# Patient Record
Sex: MALE | Race: BLACK OR AFRICAN AMERICAN | NOT HISPANIC OR LATINO | URBAN - METROPOLITAN AREA
[De-identification: names, ages, dates, MRNs, and addresses within clinical notes are randomized per-mention and may not be internally consistent; named-entity substitution may affect disease eponyms.]

---

## 2023-06-29 ENCOUNTER — HOSPITAL ENCOUNTER (INPATIENT)
Facility: HOSPITAL | Age: 28
LOS: 8 days | Discharge: HOME | DRG: 885 | End: 2023-07-07
Attending: PSYCHIATRY & NEUROLOGY | Admitting: PSYCHIATRY & NEUROLOGY
Payer: MEDICAID

## 2023-06-29 DIAGNOSIS — F29 PSYCHOSIS: ICD-10-CM

## 2023-06-29 PROCEDURE — 11400000 HC PSYCH PRIVATE ROOM

## 2023-06-29 RX ORDER — ACETAMINOPHEN 325 MG/1
650 TABLET ORAL EVERY 6 HOURS PRN
Status: DISCONTINUED | OUTPATIENT
Start: 2023-06-29 | End: 2023-07-07 | Stop reason: HOSPADM

## 2023-06-29 RX ORDER — MAG HYDROX/ALUMINUM HYD/SIMETH 200-200-20
30 SUSPENSION, ORAL (FINAL DOSE FORM) ORAL EVERY 6 HOURS PRN
Status: DISCONTINUED | OUTPATIENT
Start: 2023-06-29 | End: 2023-07-07 | Stop reason: HOSPADM

## 2023-06-29 RX ORDER — DIPHENHYDRAMINE HYDROCHLORIDE 50 MG/ML
50 INJECTION INTRAMUSCULAR; INTRAVENOUS EVERY 6 HOURS PRN
Status: DISCONTINUED | OUTPATIENT
Start: 2023-06-29 | End: 2023-07-07 | Stop reason: HOSPADM

## 2023-06-29 RX ORDER — LORAZEPAM 2 MG/ML
2 INJECTION INTRAMUSCULAR EVERY 4 HOURS PRN
Status: DISCONTINUED | OUTPATIENT
Start: 2023-06-29 | End: 2023-07-07 | Stop reason: HOSPADM

## 2023-06-29 RX ORDER — HALOPERIDOL 5 MG/1
10 TABLET ORAL EVERY 6 HOURS PRN
Status: DISCONTINUED | OUTPATIENT
Start: 2023-06-29 | End: 2023-07-07 | Stop reason: HOSPADM

## 2023-06-29 RX ORDER — IBUPROFEN 200 MG
1 TABLET ORAL DAILY
Status: DISCONTINUED | OUTPATIENT
Start: 2023-06-29 | End: 2023-07-07 | Stop reason: HOSPADM

## 2023-06-29 RX ORDER — DIPHENHYDRAMINE HCL 50 MG
50 CAPSULE ORAL EVERY 6 HOURS PRN
Status: DISCONTINUED | OUTPATIENT
Start: 2023-06-29 | End: 2023-07-07 | Stop reason: HOSPADM

## 2023-06-29 RX ORDER — HYDROXYZINE HYDROCHLORIDE 50 MG/1
50 TABLET, FILM COATED ORAL EVERY 4 HOURS PRN
Status: DISCONTINUED | OUTPATIENT
Start: 2023-06-29 | End: 2023-07-07 | Stop reason: HOSPADM

## 2023-06-29 RX ORDER — HALOPERIDOL 5 MG/ML
10 INJECTION INTRAMUSCULAR EVERY 6 HOURS PRN
Status: DISCONTINUED | OUTPATIENT
Start: 2023-06-29 | End: 2023-07-07 | Stop reason: HOSPADM

## 2023-06-29 RX ORDER — TRAZODONE HYDROCHLORIDE 100 MG/1
100 TABLET ORAL NIGHTLY PRN
Status: DISCONTINUED | OUTPATIENT
Start: 2023-06-29 | End: 2023-07-07 | Stop reason: HOSPADM

## 2023-06-29 NOTE — PLAN OF CARE
Problem: Adult Inpatient Plan of Care  Goal: Plan of Care Review  Outcome: Ongoing, Not Progressing  Goal: Patient-Specific Goal (Individualized)  Outcome: Ongoing, Not Progressing  Goal: Absence of Hospital-Acquired Illness or Injury  Outcome: Ongoing, Not Progressing  Goal: Optimal Comfort and Wellbeing  Outcome: Ongoing, Not Progressing  Goal: Readiness for Transition of Care  Outcome: Ongoing, Not Progressing     Problem: Behavior Regulation Impairment (Psychotic Signs/Symptoms)  Goal: Improved Behavioral Control (Psychotic Signs/Symptoms)  Outcome: Ongoing, Not Progressing     Problem: Cognitive Impairment (Psychotic Signs/Symptoms)  Goal: Optimal Cognitive Function (Psychotic Signs/Symptoms)  Outcome: Ongoing, Not Progressing     Problem: Decreased Participation and Engagement (Psychotic Signs/Symptoms)  Goal: Increased Participation and Engagement (Psychotic Signs/Symptoms)  Outcome: Ongoing, Not Progressing     Problem: Mood Impairment (Psychotic Signs/Symptoms)  Goal: Improved Mood Symptoms (Psychotic Signs/Symptoms)  Outcome: Ongoing, Not Progressing     Problem: Psychomotor Impairment (Psychotic Signs/Symptoms)  Goal: Improved Psychomotor Symptoms (Psychotic Signs/Symptoms)  Outcome: Ongoing, Not Progressing     Problem: Sensory Perception Impairment (Psychotic Signs/Symptoms)  Goal: Decreased Sensory Symptoms (Psychotic Signs/Symptoms)  Outcome: Ongoing, Not Progressing     Problem: Sleep Disturbance (Psychotic Signs/Symptoms)  Goal: Improved Sleep (Psychotic Signs/Symptoms)  Outcome: Ongoing, Not Progressing     Problem: Social, Occupational or Functional Impairment (Psychotic Signs/Symptoms)  Goal: Enhanced Social, Occupational or Functional Skills (Psychotic Signs/Symptoms)  Outcome: Ongoing, Not Progressing     Problem: Activity and Energy Impairment (Excessive Substance Use)  Goal: Optimized Energy Level (Excessive Substance Use)  Outcome: Ongoing, Not Progressing     Problem: Behavior Regulation  Impairment (Excessive Substance Use)  Goal: Improved Behavioral Control (Excessive Substance Use)  Outcome: Ongoing, Not Progressing     Problem: Decreased Participation and Engagement (Excessive Substance Use)  Goal: Increased Participation and Engagement (Excessive Substance Use)  Outcome: Ongoing, Not Progressing     Problem: Physiologic Impairment (Excessive Substance Use)  Goal: Improved Physiologic Symptoms (Excessive Substance Use)  Outcome: Ongoing, Not Progressing     Problem: Social, Occupational or Functional Impairment (Excessive Substance Use)  Goal: Enhanced Social, Occupational or Functional Skills (Excessive Substance Use)  Outcome: Ongoing, Not Progressing

## 2023-06-29 NOTE — NURSING
"Admission Note:    Libia Escalona is a 27 y.o. male, : 1995, MRN: [id number], admitted on 2023 to [facility] ([facility]) for Ceasar Newman MD with a diagnosis of Psychosis [F29]. Patient admitted on a status of Physician Emergency Certificate (PEC). Libia reports no known food or drug allergies.    Patient demonstrated an affect that was flat, anxious, and inappropriate. Patient demonstrated mood during assessment that was anxious. Patient had an appearance that was disheveled.  Patient denies suicidal ideation. Patient denies suicide plan. Patient denies hallucinations.    Carmens  height is 5' 6" (1.676 m) and weight is 49.1 kg (108 lb 3.9 oz). His oral temperature is 98.1 °F (36.7 °C). His blood pressure is 117/57 (abnormal) and his pulse is 71. His respiration is 20 and oxygen saturation is 98%.     Carmens last BM was noted on: 23.    Metal detector screening performed via security personnel. The result of the scan was no contraband found.Head-to-toe physical assessment completed with the following findings: no wounds found upon body screen. A full skin assessment was performed. Carmens skin appeared warm, dry, and intact. Lbiia was oriented to unit, staff, peers, and room. Patient belongings/valuables stored in locked intake room cabinet and changes of clothing provided to patient. Libia was placed on Q 15 min observations.       "

## 2023-06-30 LAB
CHOLEST SERPL-MCNC: 116 MG/DL
CHOLEST/HDLC SERPL: 2 {RATIO} (ref 0–5)
EST. AVERAGE GLUCOSE BLD GHB EST-MCNC: 102.5 MG/DL
GLUCOSE P FAST SERPL-MCNC: 87 MG/DL (ref 70–155)
HBA1C MFR BLD: 5.2 %
HDLC SERPL-MCNC: 49 MG/DL (ref 35–60)
LDLC SERPL CALC-MCNC: 58 MG/DL (ref 50–140)
T PALLIDUM AB SER QL: NONREACTIVE
TRIGL SERPL-MCNC: 44 MG/DL (ref 34–140)
VLDLC SERPL CALC-MCNC: 9 MG/DL

## 2023-06-30 PROCEDURE — 86780 TREPONEMA PALLIDUM: CPT | Performed by: PSYCHIATRY & NEUROLOGY

## 2023-06-30 PROCEDURE — 80061 LIPID PANEL: CPT | Performed by: PSYCHIATRY & NEUROLOGY

## 2023-06-30 PROCEDURE — 11400000 HC PSYCH PRIVATE ROOM

## 2023-06-30 PROCEDURE — 25000003 PHARM REV CODE 250

## 2023-06-30 PROCEDURE — 82947 ASSAY GLUCOSE BLOOD QUANT: CPT | Performed by: PSYCHIATRY & NEUROLOGY

## 2023-06-30 PROCEDURE — 83036 HEMOGLOBIN GLYCOSYLATED A1C: CPT | Performed by: PSYCHIATRY & NEUROLOGY

## 2023-06-30 RX ORDER — OLANZAPINE 5 MG/1
10 TABLET ORAL DAILY
Status: DISCONTINUED | OUTPATIENT
Start: 2023-06-30 | End: 2023-07-03

## 2023-06-30 RX ADMIN — OLANZAPINE 10 MG: 5 TABLET, FILM COATED ORAL at 10:06

## 2023-06-30 NOTE — PROGRESS NOTES
"Libia is a 27 male admitted for Psychotic Disorder NOS and Other (or Unknown) Substance Abuse, with an unknown uds results. Pt reports ability to perform his ADL's despite unkempt appearance and licks himself. CTRS educated Pt to TR group times and dates, CTRS will encourage Libia to attend due to psychosis at this time. Pt reported TR treatment goal as sobriety and community  services.       06/30/23 0946   General   Admit Date 06/29/23   Primary Diagnosis Psychotic Disorder NOS   Secondary Diagnosis Other (or Unknown) Substance Abuse   Mormonism Adventist   Number of Children 0   Children Living? 0   Occupation unemployed   Does the patient have dentures? No   If you were to take part in activities, which of the following would you prefer? Activities that you do alone   Do you feel like you have enough to keep you busy now? Yes   Do you believe that you have the opportunity for physical activity? Yes   Activity Capabilities Minimum   Subjective   Patient states "Drug abuse, I smoked some mojo"   Assessment   Mobility ambulates independently   Transfers independently   Visual Acuity normal vision   Visual Perception depth perception;color perception;recognizes letters;recognizes numbers   Hearing normal   Speech/Communication normal   Cognitive Concerns problem solving;abstract thinking;concentration;attention span;confusion;further evaluation may determine other cognitive concerns   Emotional Concerns withholds emotional response   Leisure Interest Survey   Leisure Interest Survey Yes   Solitary Activities   Music Listening Current Interest   Reading Current Interest   Physical Activities   Basketball Current Interest   Outdoor Activities   Other Outdoor Activity Other (see comment)  (outdoors)   Therapeutic Recreation   Community Reintegration Needs increased awareness of accessibility issues   Stress Management/Relaxation Training Unable to identify stress management/relaxation techniques   Social Skills " Atypical behaviors & mannerisms are observed   Leisure Education Lacks understanding of value of leisure involvement in recreation   Leisure Resources Lacks awareness of leisure resources   Leisure Attitude/Participation Unable to identify benefits of leisure involvement   Rec Therapy Group Assistance Independent;verbal cues   Problem Solving Activity Assistance verbal cues;Stand by Assistance   Goals   Additional Documentation yes   Goal Formulation With patient   Time For Goal Achievement 7 days   Goal 1 soberity & community  services   Plan   Planned Therapy Intervention Group Recreational Therapy   Expected Length of Stay 5-7days   PT Frequency Minimum of 3 visits per week
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
## 2023-06-30 NOTE — GROUP NOTE
Group Psychotherapy       Group Focus: Stress Management   Group topic: Therapy Ball: Therapist explored patients need for identifying personal strengths and qualities in working towards mental health goals by asking questions from our therapy ball. Patient was able to discuss increasing emotional and mental wellness through positive thinking and strength recognition. Patient continues to make progress towards treatment goals.    Number of patients in attendance: 3      Group Start Time: 1045  Group End Time:  1130  Groups Date: 6/30/2023  Group Topic:  Behavioral Health  Group Department: [facility]  Group Facilitators:  Tessie Mcmahon  _____________________________________________________________________    Patient Name: Libia Escalona  MRN: [id number]  Patient Class: IP- Psych   Admission Date\Time: 6/29/2023  2:44 PM  Hospital Length of Stay: 8  Primary Care Provider: Rolando Da Silva Jr, MD     Referred by: Acute Psychiatry Unit Treatment Team     Target symptoms: Psychosis     Patient's response to treatment: Not Participating     Progress toward goals: Not progressing     Interval History:      Diagnosis:      Plan: Continue treatment on APU

## 2023-06-30 NOTE — PLAN OF CARE
Problem: Adult Inpatient Plan of Care  Goal: Plan of Care Review  Outcome: Ongoing, Progressing  Goal: Patient-Specific Goal (Individualized)  Outcome: Ongoing, Progressing  Goal: Absence of Hospital-Acquired Illness or Injury  Outcome: Ongoing, Progressing  Goal: Optimal Comfort and Wellbeing  Outcome: Ongoing, Progressing  Goal: Readiness for Transition of Care  Outcome: Ongoing, Progressing     Problem: Behavior Regulation Impairment (Psychotic Signs/Symptoms)  Goal: Improved Behavioral Control (Psychotic Signs/Symptoms)  Outcome: Ongoing, Progressing     Problem: Cognitive Impairment (Psychotic Signs/Symptoms)  Goal: Optimal Cognitive Function (Psychotic Signs/Symptoms)  Outcome: Ongoing, Progressing     Problem: Decreased Participation and Engagement (Psychotic Signs/Symptoms)  Goal: Increased Participation and Engagement (Psychotic Signs/Symptoms)  Outcome: Ongoing, Progressing     Problem: Mood Impairment (Psychotic Signs/Symptoms)  Goal: Improved Mood Symptoms (Psychotic Signs/Symptoms)  Outcome: Ongoing, Progressing     Problem: Psychomotor Impairment (Psychotic Signs/Symptoms)  Goal: Improved Psychomotor Symptoms (Psychotic Signs/Symptoms)  Outcome: Ongoing, Progressing     Problem: Sensory Perception Impairment (Psychotic Signs/Symptoms)  Goal: Decreased Sensory Symptoms (Psychotic Signs/Symptoms)  Outcome: Ongoing, Progressing     Problem: Sleep Disturbance (Psychotic Signs/Symptoms)  Goal: Improved Sleep (Psychotic Signs/Symptoms)  Outcome: Ongoing, Progressing     Problem: Social, Occupational or Functional Impairment (Psychotic Signs/Symptoms)  Goal: Enhanced Social, Occupational or Functional Skills (Psychotic Signs/Symptoms)  Outcome: Ongoing, Progressing     Problem: Activity and Energy Impairment (Excessive Substance Use)  Goal: Optimized Energy Level (Excessive Substance Use)  Outcome: Ongoing, Progressing     Problem: Behavior Regulation Impairment (Excessive Substance Use)  Goal: Improved  Behavioral Control (Excessive Substance Use)  Outcome: Ongoing, Progressing     Problem: Decreased Participation and Engagement (Excessive Substance Use)  Goal: Increased Participation and Engagement (Excessive Substance Use)  Outcome: Ongoing, Progressing     Problem: Physiologic Impairment (Excessive Substance Use)  Goal: Improved Physiologic Symptoms (Excessive Substance Use)  Outcome: Ongoing, Progressing     Problem: Social, Occupational or Functional Impairment (Excessive Substance Use)  Goal: Enhanced Social, Occupational or Functional Skills (Excessive Substance Use)  Outcome: Ongoing, Progressing

## 2023-06-30 NOTE — PLAN OF CARE
Problem: Adult Inpatient Plan of Care  Goal: Plan of Care Review  Outcome: Ongoing, Not Progressing  Goal: Patient-Specific Goal (Individualized)  Outcome: Ongoing, Not Progressing  Goal: Absence of Hospital-Acquired Illness or Injury  Outcome: Ongoing, Not Progressing  Goal: Optimal Comfort and Wellbeing  Outcome: Ongoing, Not Progressing  Goal: Readiness for Transition of Care  Outcome: Ongoing, Not Progressing     Problem: Behavior Regulation Impairment (Psychotic Signs/Symptoms)  Goal: Improved Behavioral Control (Psychotic Signs/Symptoms)  Outcome: Ongoing, Not Progressing     Problem: Cognitive Impairment (Psychotic Signs/Symptoms)  Goal: Optimal Cognitive Function (Psychotic Signs/Symptoms)  Outcome: Ongoing, Not Progressing     Problem: Decreased Participation and Engagement (Psychotic Signs/Symptoms)  Goal: Increased Participation and Engagement (Psychotic Signs/Symptoms)  Outcome: Ongoing, Not Progressing     Problem: Mood Impairment (Psychotic Signs/Symptoms)  Goal: Improved Mood Symptoms (Psychotic Signs/Symptoms)  Outcome: Ongoing, Not Progressing     Problem: Psychomotor Impairment (Psychotic Signs/Symptoms)  Goal: Improved Psychomotor Symptoms (Psychotic Signs/Symptoms)  Outcome: Ongoing, Not Progressing     Problem: Sensory Perception Impairment (Psychotic Signs/Symptoms)  Goal: Decreased Sensory Symptoms (Psychotic Signs/Symptoms)  Outcome: Ongoing, Not Progressing     Problem: Sleep Disturbance (Psychotic Signs/Symptoms)  Goal: Improved Sleep (Psychotic Signs/Symptoms)  Outcome: Ongoing, Not Progressing     Problem: Social, Occupational or Functional Impairment (Psychotic Signs/Symptoms)  Goal: Enhanced Social, Occupational or Functional Skills (Psychotic Signs/Symptoms)  Outcome: Ongoing, Not Progressing     Problem: Activity and Energy Impairment (Excessive Substance Use)  Goal: Optimized Energy Level (Excessive Substance Use)  Outcome: Ongoing, Not Progressing     Problem: Physiologic  Impairment (Excessive Substance Use)  Goal: Improved Physiologic Symptoms (Excessive Substance Use)  Outcome: Ongoing, Not Progressing     Problem: Social, Occupational or Functional Impairment (Excessive Substance Use)  Goal: Enhanced Social, Occupational or Functional Skills (Excessive Substance Use)  Outcome: Ongoing, Not Progressing     Problem: Decreased Participation and Engagement (Excessive Substance Use)  Goal: Increased Participation and Engagement (Excessive Substance Use)  Outcome: Ongoing, Not Progressing

## 2023-06-30 NOTE — NURSING
"Daily Nursing Note:      Behavior:    Patient (Libia Escalona is a 27 y.o. male, : 1995, MRN: [id number]) demonstrating an affect that was flat and anxious. Libia demonstrating mood that is depressed and anxious. Libia had an appearance that was disheveled. Libia denies suicidal ideation. Libia denies suicide plan. Libia denies homicidal ideation. Libia denies hallucinations.    Libia's  height is 5' 6" (1.676 m) and weight is 49.1 kg (108 lb 3.9 oz). His oral temperature is 98.1 °F (36.7 °C). His blood pressure is 117/57 (abnormal) and his pulse is 71. His respiration is 20 and oxygen saturation is 98%.       Intervention:    Encourage Libia to perform self-hygiene, grooming, and changing of clothing. Monitor Libia's behavior and program compliance. Monitor [place] for suicidal ideation, homicidal ideation, sleep disturbance, and hallucinations. Encourage Libia to eat all portions of meals and assess for meal preferences. Monitor [place] for intake and output to ensure hydration. Notify the Physician/Physician Assistant/Advance Practice Registered Nurse (MD/PA/APRN) for any medication refusal and any change in patient condition.      Response:    Libia verbalizes understand of unit process and procedures.      Plan:     Continue to monitor per MD/PA/APRN orders; maintain patient safety.   "

## 2023-06-30 NOTE — H&P
[facility] - Behavioral Health Unit  History & Physical    Subjective:      Chief Complaint/Reason for Admission: psychosis with substance abuse     Libia Escalona is a 27 y.o. male. Psychosis recently     No past medical history on file.  No past surgical history on file.  No family history on file.       No medications prior to admission.     Review of patient's allergies indicates:  No Known Allergies     Review of Systems   Constitutional: Negative.    HENT: Negative.     Eyes: Negative.    Respiratory: Negative.     Cardiovascular: Negative.    Gastrointestinal: Negative.    Genitourinary: Negative.    Musculoskeletal: Negative.    Skin: Negative.    Neurological: Negative.    Endo/Heme/Allergies: Negative.    Psychiatric/Behavioral:  Positive for substance abuse. Negative for depression, hallucinations and suicidal ideas.      Objective:      Vital Signs (Most Recent)  Temp: 97.7 °F (36.5 °C) (06/30/23 1100)  Pulse: (!) 59 (06/30/23 1100)  Resp: 18 (06/30/23 1100)  BP: 94/66 (06/30/23 1100)  SpO2: 98 % (06/30/23 1100)    Vital Signs Range (Last 24H):  Temp:  [97.7 °F (36.5 °C)-98.1 °F (36.7 °C)]   Pulse:  [59-82]   Resp:  [18-20]   BP: ()/(57-89)   SpO2:  [98 %]     Physical Exam  HENT:      Head: Normocephalic.      Right Ear: Tympanic membrane normal.      Left Ear: Tympanic membrane normal.      Nose: Nose normal.      Mouth/Throat:      Mouth: Mucous membranes are moist.   Eyes:      Extraocular Movements: Extraocular movements intact.      Pupils: Pupils are equal, round, and reactive to light.   Cardiovascular:      Rate and Rhythm: Normal rate and regular rhythm.   Pulmonary:      Effort: Pulmonary effort is normal.   Abdominal:      General: Abdomen is flat.   Musculoskeletal:         General: Normal range of motion.   Skin:     General: Skin is warm.   Neurological:      General: No focal deficit present.      Mental Status: He is alert and oriented to person, place, and time.       Comments: Vision normal   Hearing normal   EOM intact   Face muscles normal  Facial sensation normal   Shrugs shoulders  Tongue midline          Data Review:    Recent Results (from the past 48 hour(s))   SYPHILIS ANTIBODY (WITH REFLEX RPR)    Collection Time: 06/30/23  6:44 AM   Result Value Ref Range    Syphilis Antibody Nonreactive Nonreactive, Equivocal   Hemoglobin A1C    Collection Time: 06/30/23  6:44 AM   Result Value Ref Range    Hemoglobin A1c 5.2 <=7.0 %    Estimated Average Glucose 102.5 mg/dL   Glucose, Fasting    Collection Time: 06/30/23  6:44 AM   Result Value Ref Range    Glucose Fasting 87 70 - 155 mg/dL   Lipid Panel    Collection Time: 06/30/23  6:44 AM   Result Value Ref Range    Cholesterol Total 116 <=200 mg/dL    HDL Cholesterol 49 35 - 60 mg/dL    Triglyceride 44 34 - 140 mg/dL    Cholesterol/HDL Ratio 2 0 - 5    Very Low Density Lipoprotein 9     LDL Cholesterol 58.00 50.00 - 140.00 mg/dL        No results found.       Assessment and Plan       Psychosis   Substance abuse

## 2023-06-30 NOTE — GROUP NOTE
Group Psychotherapy       Group Focus: Medication Education.      Number of patients in attendance: 14    Group Start Time: 0800  Group End Time:  0900  Groups Date: 6/30/2023  Group Topic:  Behavioral Health  Group Department: [facility]  Group Facilitators:  Jesus Crooks RN  _____________________________________________________________________    Patient Name: Libia Escalona  MRN: [id number]  Patient Class: IP- Psych   Admission Date\Time: 6/29/2023  2:44 PM  Hospital Length of Stay: 1  Primary Care Provider: Rolando Da Silva Jr, MD     Referred by: Acute Psychiatry Unit Treatment Team     Target symptoms: Anxiety and Psychosis     Patient's response to treatment: Active Listening     Progress toward goals: Progressing well     Interval History:      Diagnosis: Psychosis.     Plan: Continue treatment on APU

## 2023-06-30 NOTE — PLAN OF CARE
Psychosocial Assessment     Pt is a 27 y.o. YO  male admitted due to psychosis. Pt UDS was Positive for THC pt admits to smoking mojo.  Pt ETOH < 10. Pt reports  AVH at this time. Pt last inpatient  was 3 years ago . Pt presents Cooperative with CM staff 1:1. Pt originally from [place]  . Pt has  0 dependents. Pt  is Single .  Pt completed Middle School. Pt denies  service.  Pt reports financial issues. Pt unemployed.  Pt works at  unemployed. Pt denies legal issues. Pt states they do  receive comfort from spiritual practices. Pt emergency contact is Mother Leti Larsen. Their phone number is  [phone] . Pt discharge plan at this time is home [address]    06/30/23 4040   Initial Information   Source of Information patient   Reason for Admission Psychosis   Patient Aware of Diagnosis yes   Arrived From emergency department   Legal Status    Type of Admission Involuntary   Type of Involuntary Admission PEC;CEC   Spiritual Beliefs   Spiritual, Cultural Beliefs, Druze Practices, Values that Affect Care yes   Description of Beliefs that Will Affect Care Buddhist   Substance Use/Withdrawal   Substance Use Current, used prior to admission   Additional Tobacco Use   How many cigarettes do you typically have per day? 20   Abuse Screen (yes response referral indicated)   Feels Unsafe at Home or Work/School no   Feels Threatened by Someone no   Does anyone try to keep you from having contact with others or doing things outside your home? no   AUDIT-C (Alcohol Use Disorders ID Test)   Alcohol Use In Past Year 1-->monthly or less   Alcohol Amount Per Day In Past Year 0-->one or two   More Than 6 Drinks On One Occasion In Past Year 0-->never   Total Audit C Score 1     .

## 2023-06-30 NOTE — NURSING
"Daily Nursing Note:      Behavior:    Patient (Libia Escalona is a 27 y.o. male, : 1995, MRN: [id number]) demonstrating an affect that was flat, anxious, irritable,  labile, and inappropriate. Libia demonstrating mood that is anxious. Libia had an appearance that was disheveled. Libia denies suicidal ideation. Libia denies suicide plan. Libia denies homicidal ideation. Libia endorses hallucinations.    Libia's  height is 5' 6" (1.676 m) and weight is 49.1 kg (108 lb 3.9 oz). His oral temperature is 98.1 °F (36.7 °C). His blood pressure is 117/57 (abnormal) and his pulse is 71. His respiration is 20 and oxygen saturation is 98%.     Carmens last BM was noted on: 23.      Intervention:    Encourage Libia to perform self-hygiene, grooming, and changing of clothing. Monitor Libia's behavior and program compliance. Monitor Libia for suicidal ideation, homicidal ideation, sleep disturbance, and hallucinations. Encourage Libia to eat all portions of meals and assess for meal preferences. Monitor Cordtyler for intake and output to ensure hydration. Notify the Physician/Physician Assistant/Advance Practice Registered Nurse (MD/PA/APRN) for any medication refusal and any change in patient condition.      Response:    Libia verbalizes understand of unit process and procedures.      Plan:     Continue to monitor per MD/PA/APRN orders; maintain patient safety.    "

## 2023-07-01 PROCEDURE — 11400000 HC PSYCH PRIVATE ROOM

## 2023-07-01 PROCEDURE — 25000003 PHARM REV CODE 250

## 2023-07-01 RX ADMIN — OLANZAPINE 10 MG: 5 TABLET, FILM COATED ORAL at 08:07

## 2023-07-01 NOTE — NURSING
"Daily Nursing Note:      Behavior:    Patient (Libia Escalona is a 27 y.o. male, : 1995, MRN: [id number]) demonstrating an affect that was flat, anxious, irritable, and  labile. Libia demonstrating mood that is anxious. Libia had an appearance that was disheveled. Libia denies suicidal ideation. Libia denies suicide plan. Libia denies homicidal ideation. Libia endorses hallucinations.    Libia's  height is 5' 6" (1.676 m) and weight is 49.1 kg (108 lb 3.9 oz). His temperature is 98 °F (36.7 °C). His blood pressure is 89/59 (abnormal) and his pulse is 83. His respiration is 18 and oxygen saturation is 98%.     Carmens last BM was noted on: 23.      Intervention:    Encourage Libia to perform self-hygiene, grooming, and changing of clothing. Monitor Libia's behavior and program compliance. Monitor Libia for suicidal ideation, homicidal ideation, sleep disturbance, and hallucinations. Encourage Libia to eat all portions of meals and assess for meal preferences. Monitor Libia for intake and output to ensure hydration. Notify the Physician/Physician Assistant/Advance Practice Registered Nurse (MD/PA/APRN) for any medication refusal and any change in patient condition.      Response:    Libia verbalizes understand of unit process and procedures.       Plan:     Continue to monitor per MD/PA/APRN orders; maintain patient safety.    "

## 2023-07-01 NOTE — PLAN OF CARE
Problem: Adult Inpatient Plan of Care  Goal: Plan of Care Review  Outcome: Ongoing, Progressing  Goal: Patient-Specific Goal (Individualized)  Outcome: Ongoing, Progressing  Goal: Absence of Hospital-Acquired Illness or Injury  Outcome: Ongoing, Progressing  Goal: Optimal Comfort and Wellbeing  Outcome: Ongoing, Progressing  Goal: Readiness for Transition of Care  Outcome: Ongoing, Progressing     Problem: Behavior Regulation Impairment (Psychotic Signs/Symptoms)  Goal: Improved Behavioral Control (Psychotic Signs/Symptoms)  Outcome: Ongoing, Progressing     Problem: Cognitive Impairment (Psychotic Signs/Symptoms)  Goal: Optimal Cognitive Function (Psychotic Signs/Symptoms)  Outcome: Ongoing, Progressing     Problem: Cognitive Impairment (Psychotic Signs/Symptoms)  Goal: Optimal Cognitive Function (Psychotic Signs/Symptoms)  Outcome: Ongoing, Progressing     Problem: Decreased Participation and Engagement (Psychotic Signs/Symptoms)  Goal: Increased Participation and Engagement (Psychotic Signs/Symptoms)  Outcome: Ongoing, Progressing     Problem: Mood Impairment (Psychotic Signs/Symptoms)  Goal: Improved Mood Symptoms (Psychotic Signs/Symptoms)  Outcome: Ongoing, Progressing

## 2023-07-01 NOTE — GROUP NOTE
Group Psychotherapy       Group Focus: Psychiatric Medication Education      Number of patients in attendance: 12    Group Start Time: 2030  Group End Time:  2100  Groups Date: 6/30/2023  Group Topic:  Behavioral Health  Group Department: [facility]  Group Facilitators:  Dianne Kaplan RN  _____________________________________________________________________    Patient Name: Libia Escalona  MRN: [id number]  Patient Class: IP- Psych   Admission Date\Time: 6/29/2023  2:44 PM  Hospital Length of Stay: 1  Primary Care Provider: Rolando Da Silva Jr, MD     Referred by: Acute Psychiatry Unit Treatment Team     Target symptoms: Psychosis     Patient's response to treatment: Not Participating     Progress toward goals: Progressing slowly     Interval History:      Diagnosis: Psychosis     Plan: Continue treatment on APU

## 2023-07-01 NOTE — NURSING
"Daily Nursing Note:      Behavior:    Patient (Libia Escalona is a 27 y.o. male, : 1995, MRN: [id number]) demonstrating an affect that was flat and anxious. Libia demonstrating mood that is anxious. Libia had an appearance that was disheveled. Libia denies suicidal ideation. Libia denies suicide plan. Libia denies homicidal ideation. Libia denies hallucinations.    Libia's  height is 5' 6" (1.676 m) and weight is 49.1 kg (108 lb 3.9 oz). His temperature is 97.7 °F (36.5 °C). His blood pressure is 94/66 and his pulse is 59 (abnormal). His respiration is 18 and oxygen saturation is 98%.       Intervention:    Encourage Libia to perform self-hygiene, grooming, and changing of clothing. Monitor Libia's behavior and program compliance. Monitor Cordtyler for suicidal ideation, homicidal ideation, sleep disturbance, and hallucinations. Encourage Libia to eat all portions of meals and assess for meal preferences. Monitor Cordez for intake and output to ensure hydration. Notify the Physician/Physician Assistant/Advance Practice Registered Nurse (MD/PA/APRN) for any medication refusal and any change in patient condition.      Response:    Libia verbalizes understand of unit process and procedures.      Plan:     Continue to monitor per MD/PA/APRN orders; maintain patient safety.   "

## 2023-07-01 NOTE — GROUP NOTE
Group Psychotherapy       Group Focus: Medication Education.      Number of patients in attendance: 12    Group Start Time: 0800  Group End Time:  0900  Groups Date: 7/1/2023  Group Topic:  Behavioral Health  Group Department: [facility] Unit  Group Facilitators:  Jesus Crooks RN  _____________________________________________________________________    Patient Name: Libia Escalona  MRN: [id number]  Patient Class: IP- Psych   Admission Date\Time: 6/29/2023  2:44 PM  Hospital Length of Stay: 2  Primary Care Provider: Rolando Da Silva Jr, MD     Referred by: Acute Psychiatry Unit Treatment Team     Target symptoms: Anxiety and Psychosis     Patient's response to treatment: Active Listening     Progress toward goals: Progressing well     Interval History:      Diagnosis: Psychosis.     Plan: Continue treatment on APU

## 2023-07-02 PROCEDURE — 11400000 HC PSYCH PRIVATE ROOM

## 2023-07-02 PROCEDURE — 25000003 PHARM REV CODE 250

## 2023-07-02 RX ADMIN — OLANZAPINE 10 MG: 5 TABLET, FILM COATED ORAL at 08:07

## 2023-07-02 NOTE — NURSING
"Daily Nursing Note:      Behavior:    Patient (Libia Escalona is a 27 y.o. male, : 1995, MRN: [id number]) demonstrating an affect that was flat and anxious. Libia demonstrating mood that is anxious. Libia had an appearance that was disheveled. Libia denies suicidal ideation. Libia denies suicide plan. Libia denies homicidal ideation. Libia endorses hallucinations.    Libia's  height is 5' 6" (1.676 m) and weight is 49.1 kg (108 lb 3.9 oz). His temperature is 98.1 °F (36.7 °C). His blood pressure is 123/76 and his pulse is 80. His respiration is 20 and oxygen saturation is 98%.     Carmens last BM was noted on: 23.      Intervention:    Encourage Libia to perform self-hygiene, grooming, and changing of clothing. Monitor Libia's behavior and program compliance. Monitor Libia for suicidal ideation, homicidal ideation, sleep disturbance, and hallucinations. Encourage Libia to eat all portions of meals and assess for meal preferences. Monitor Cordez for intake and output to ensure hydration. Notify the Physician/Physician Assistant/Advance Practice Registered Nurse (MD/PA/APRN) for any medication refusal and any change in patient condition.      Response:    Libia verbalizes understand of unit process and procedures.       Plan:     Continue to monitor per MD/PA/APRN orders; maintain patient safety.    "

## 2023-07-02 NOTE — GROUP NOTE
Group Psychotherapy       Group Focus: Medication Education.      Number of patients in attendance: 15    Group Start Time: 0800  Group End Time:  0900  Groups Date: 7/2/2023  Group Topic:  Behavioral Health  Group Department: [facility]  Group Facilitators:  Jesus Crooks RN  _____________________________________________________________________    Patient Name: Libia Escalona  MRN: [id number]  Patient Class: IP- Psych   Admission Date\Time: 6/29/2023  2:44 PM  Hospital Length of Stay: 3  Primary Care Provider: Rolando Da Silva Jr, MD     Referred by: Acute Psychiatry Unit Treatment Team     Target symptoms: Anxiety and Psychosis     Patient's response to treatment: Active Listening     Progress toward goals: Progressing well     Interval History:      Diagnosis: Psychosis.     Plan: Continue treatment on APU

## 2023-07-02 NOTE — NURSING
"Daily Nursing Note:      Behavior:    Patient (Libia Escalona is a 27 y.o. male, : 1995, MRN: [id number]) demonstrating an affect that was flat and anxious. Libia demonstrating mood that is anxious. Libia had an appearance that was disheveled. Libia denies suicidal ideation. Libia denies suicide plan. Libia denies homicidal ideation. Libia denies hallucinations.    Libia's  height is 5' 6" (1.676 m) and weight is 49.1 kg (108 lb 3.9 oz). His temperature is 97.9 °F (36.6 °C). His blood pressure is 98/55 (abnormal) and his pulse is 93. His respiration is 20 and oxygen saturation is 100%.     Carmens last BM was noted on: 23      Intervention:    Encourage Libia to perform self-hygiene, grooming, and changing of clothing. Monitor Libia's behavior and program compliance. Monitor Libia for suicidal ideation, homicidal ideation, sleep disturbance, and hallucinations. Encourage Libia to eat all portions of meals and assess for meal preferences. Monitor Cordez for intake and output to ensure hydration. Notify the Physician/Physician Assistant/Advance Practice Registered Nurse (MD/PA/APRN) for any medication refusal and any change in patient condition.      Response:    Libia verbalizes understand of unit process and procedures. Libia reported medications ______.      Plan:     Continue to monitor per MD/PA/APRN orders; maintain patient safety.   "

## 2023-07-02 NOTE — PLAN OF CARE
Problem: Behavior Regulation Impairment (Psychotic Signs/Symptoms)  Goal: Improved Behavioral Control (Psychotic Signs/Symptoms)  Outcome: Ongoing, Progressing     Problem: Cognitive Impairment (Psychotic Signs/Symptoms)  Goal: Optimal Cognitive Function (Psychotic Signs/Symptoms)  Outcome: Ongoing, Progressing     Problem: Decreased Participation and Engagement (Psychotic Signs/Symptoms)  Goal: Increased Participation and Engagement (Psychotic Signs/Symptoms)  Outcome: Ongoing, Progressing     Problem: Mood Impairment (Psychotic Signs/Symptoms)  Goal: Improved Mood Symptoms (Psychotic Signs/Symptoms)  Outcome: Ongoing, Progressing     Problem: Psychomotor Impairment (Psychotic Signs/Symptoms)  Goal: Improved Psychomotor Symptoms (Psychotic Signs/Symptoms)  Outcome: Ongoing, Progressing     Problem: Sensory Perception Impairment (Psychotic Signs/Symptoms)  Goal: Decreased Sensory Symptoms (Psychotic Signs/Symptoms)  Outcome: Ongoing, Progressing     Problem: Sleep Disturbance (Psychotic Signs/Symptoms)  Goal: Improved Sleep (Psychotic Signs/Symptoms)  Outcome: Ongoing, Progressing     Problem: Social, Occupational or Functional Impairment (Psychotic Signs/Symptoms)  Goal: Enhanced Social, Occupational or Functional Skills (Psychotic Signs/Symptoms)  Outcome: Ongoing, Progressing     Problem: Activity and Energy Impairment (Excessive Substance Use)  Goal: Optimized Energy Level (Excessive Substance Use)  Outcome: Ongoing, Progressing     Problem: Behavior Regulation Impairment (Excessive Substance Use)  Goal: Improved Behavioral Control (Excessive Substance Use)  Outcome: Ongoing, Progressing     Problem: Decreased Participation and Engagement (Excessive Substance Use)  Goal: Increased Participation and Engagement (Excessive Substance Use)  Outcome: Ongoing, Progressing     Problem: Physiologic Impairment (Excessive Substance Use)  Goal: Improved Physiologic Symptoms (Excessive Substance Use)  Outcome: Ongoing,  Progressing     Problem: Social, Occupational or Functional Impairment (Excessive Substance Use)  Goal: Enhanced Social, Occupational or Functional Skills (Excessive Substance Use)  Outcome: Ongoing, Progressing     Problem: Adult Inpatient Plan of Care  Goal: Plan of Care Review  Outcome: Met  Goal: Patient-Specific Goal (Individualized)  Outcome: Met  Goal: Absence of Hospital-Acquired Illness or Injury  Outcome: Met  Goal: Optimal Comfort and Wellbeing  Outcome: Met  Goal: Readiness for Transition of Care  Outcome: Met

## 2023-07-02 NOTE — GROUP NOTE
Group Psychotherapy       Group Focus: Psychiatric Medication Education      Number of patients in attendance: 13    Group Start Time: 2030  Group End Time:  2100  Groups Date: 7/1/2023  Group Topic:  Behavioral Health  Group Department: [facility]  Group Facilitators:  Dianne Kaplan RN  _____________________________________________________________________    Patient Name: Libia Escalona  MRN: [id number]  Patient Class: IP- Psych   Admission Date\Time: 6/29/2023  2:44 PM  Hospital Length of Stay: 3  Primary Care Provider: Rolando Da Silva Jr, MD     Referred by: Acute Psychiatry Unit Treatment Team     Target symptoms: Psychosis     Patient's response to treatment: Not Participating     Progress toward goals: Progressing slowly     Interval History:      Diagnosis: Psychosis     Plan: Continue treatment on APU

## 2023-07-03 PROCEDURE — 11400000 HC PSYCH PRIVATE ROOM

## 2023-07-03 PROCEDURE — 25000003 PHARM REV CODE 250: Performed by: PSYCHIATRY & NEUROLOGY

## 2023-07-03 PROCEDURE — 25000003 PHARM REV CODE 250

## 2023-07-03 RX ORDER — BENZTROPINE MESYLATE 1 MG/1
1 TABLET ORAL 2 TIMES DAILY
Status: DISCONTINUED | OUTPATIENT
Start: 2023-07-03 | End: 2023-07-07 | Stop reason: HOSPADM

## 2023-07-03 RX ORDER — HALOPERIDOL 5 MG/1
5 TABLET ORAL 2 TIMES DAILY
Status: DISCONTINUED | OUTPATIENT
Start: 2023-07-03 | End: 2023-07-05

## 2023-07-03 RX ADMIN — HALOPERIDOL 5 MG: 5 TABLET ORAL at 11:07

## 2023-07-03 RX ADMIN — OLANZAPINE 10 MG: 5 TABLET, FILM COATED ORAL at 08:07

## 2023-07-03 RX ADMIN — BENZTROPINE MESYLATE 1 MG: 1 TABLET ORAL at 11:07

## 2023-07-03 RX ADMIN — HALOPERIDOL 5 MG: 5 TABLET ORAL at 08:07

## 2023-07-03 RX ADMIN — BENZTROPINE MESYLATE 1 MG: 1 TABLET ORAL at 08:07

## 2023-07-03 NOTE — PROGRESS NOTES
7/3/2023  Libia Escalona   1995   [id number]        Psychiatry Progress Note     Chief Complaint: Patient mumbled    SUBJECTIVE:   Libia Escalona is a 27 y.o. male placed under a PEC at [facility] after being found in his yard acting bizzarely and licking himself.    Odd behavior this morning.  Soft-spoken.  Difficult to follow.  Seen responding to internal stimuli.  Had been on Haldol 5mg daily and 10mg HS earlier this year but has apparently been non-compliant.  Will discontinue Zyprexa and restart Haldol.  Will also restart Cogentin (was on this as well).    UDS: (-)  Blood alcohol: <10       Current Medications:   Scheduled Meds:    nicotine  1 patch Transdermal Daily    OLANZapine  10 mg Oral Daily      PRN Meds: acetaminophen, aluminum-magnesium hydroxide-simethicone, diphenhydrAMINE, diphenhydrAMINE, haloperidol lactate, haloperidoL, hydrOXYzine HCL, lorazepam, traZODone   Psychotherapeutics (From admission, onward)      Start     Stop Route Frequency Ordered    06/30/23 1000  OLANZapine tablet 10 mg         -- Oral Daily 06/30/23 0852    06/29/23 1453  haloperidol lactate injection 10 mg         -- IM Every 6 hours PRN 06/29/23 1453    06/29/23 1453  haloperidoL tablet 10 mg         -- Oral Every 6 hours PRN 06/29/23 1453    06/29/23 1453  LORazepam injection 2 mg         -- IM Every 4 hours PRN 06/29/23 1453    06/29/23 1453  traZODone tablet 100 mg         -- Oral Nightly PRN 06/29/23 1453            Allergies:   Review of patient's allergies indicates:  No Known Allergies     OBJECTIVE:   Vitals   Vitals:    07/03/23 0701   BP: 106/63   Pulse: 75   Resp: 18   Temp: 98.1 °F (36.7 °C)        Labs/Imaging/Studies:   No results found for this or any previous visit (from the past 36 hour(s)).       Medical Review Of Systems:  Constitutional: negative  Respiratory: negative  Cardiovascular: negative  Gastrointestinal: negative  Genitourinary:negative  Musculoskeletal:negative  Neurological:  negative      Psychiatric Mental Status Exam:  General Appearance: disheveled  Arousal: alert  Behavior: guarded  Movements and Motor Activity: no abnormal involuntary movements noted  Orientation: oriented to person  Speech: brief responses  Mood: Paranoid  Affect: mood-congruent, constricted  Thought Process: slowed, delayed  Associations: Grossly intact  Thought Content and Perceptions: (+)paranoid delusions, (+)auditory hallucinations, (+)responding to internal stimuli, no suicidal ideation, no homicidal ideation  Recent and Remote Memory: recent memory intact, remote memory intact; per interview/observation with patient  Attention and Concentration: limited, attentive to conversation; per interview/observation with patient  Fund of Knowledge: limited; based on history, vocabulary, fund of knowledge, syntax, grammar, and content  Insight: questionable; based on understanding of severity of illness and HPI  Judgment: questionable; based on patient's behavior and HPI      ASSESSMENT/PLAN:   Problems Addressed/Diagnoses:  Schizophrenia, paranoid type(F20.0)  Cannabis use, 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     Plan:  Schizophrenia  -D/c Zyprexa  -Haldol 5mg BID  -Cogentin 1mg BID    Cannabis  -Group/Individual psychotherapy        Expected Disposition Plan: Home        Ceasar Newman M.D.

## 2023-07-03 NOTE — GROUP NOTE
Group Psychotherapy       Group Focus: Psychiatric Medication Education      Number of patients in attendance: 16    Group Start Time: 2030  Group End Time:  2100  Groups Date: 7/2/2023  Group Topic:  Behavioral Health  Group Department: [facility]  Group Facilitators:  Dianne Kaplan RN  _____________________________________________________________________    Patient Name: Libia Escalona  MRN: [id number]  Patient Class: IP- Psych   Admission Date\Time: 6/29/2023  2:44 PM  Hospital Length of Stay: 4  Primary Care Provider: Rolando Da Silva Jr, MD     Referred by: Acute Psychiatry Unit Treatment Team     Target symptoms: Psychosis     Patient's response to treatment: Not Participating     Progress toward goals: Progressing slowly     Interval History:      Diagnosis: Psychosis     Plan: Continue treatment on APU

## 2023-07-03 NOTE — NURSING
"Daily Nursing Note:      Behavior:    Patient (Libia Escalona is a 27 y.o. male, : 1995, MRN: [id number]) demonstrating an affect that was flat. Libia demonstrating mood that is depressed. Libia had an appearance that was disheveled. Libia denies suicidal ideation. Libia denies suicide plan. Libia denies homicidal ideation. Libia denies hallucinations.    Libia's  height is 5' 6" (1.676 m) and weight is 49.1 kg (108 lb 3.9 oz). His temperature is 97.8 °F (36.6 °C). His blood pressure is 115/71 and his pulse is 79. His respiration is 18 and oxygen saturation is 95%.       Intervention:    Encourage Libia to perform self-hygiene, grooming, and changing of clothing. Monitor Libia's behavior and program compliance. Monitor Cordtyler for suicidal ideation, homicidal ideation, sleep disturbance, and hallucinations. Encourage Libia to eat all portions of meals and assess for meal preferences. Monitor Cordez for intake and output to ensure hydration. Notify the Physician/Physician Assistant/Advance Practice Registered Nurse (MD/PA/APRN) for any medication refusal and any change in patient condition.      Response:        Plan:     Continue to monitor per MD/PA/APRN orders; maintain patient safety.   "

## 2023-07-03 NOTE — PLAN OF CARE
Problem: Behavior Regulation Impairment (Psychotic Signs/Symptoms)  Goal: Improved Behavioral Control (Psychotic Signs/Symptoms)  Outcome: Ongoing, Progressing     Problem: Cognitive Impairment (Psychotic Signs/Symptoms)  Goal: Optimal Cognitive Function (Psychotic Signs/Symptoms)  Outcome: Ongoing, Progressing     Problem: Decreased Participation and Engagement (Psychotic Signs/Symptoms)  Goal: Increased Participation and Engagement (Psychotic Signs/Symptoms)  Outcome: Ongoing, Progressing     Problem: Mood Impairment (Psychotic Signs/Symptoms)  Goal: Improved Mood Symptoms (Psychotic Signs/Symptoms)  Outcome: Ongoing, Progressing     Problem: Psychomotor Impairment (Psychotic Signs/Symptoms)  Goal: Improved Psychomotor Symptoms (Psychotic Signs/Symptoms)  Outcome: Ongoing, Progressing     Problem: Sensory Perception Impairment (Psychotic Signs/Symptoms)  Goal: Decreased Sensory Symptoms (Psychotic Signs/Symptoms)  Outcome: Ongoing, Progressing     Problem: Sleep Disturbance (Psychotic Signs/Symptoms)  Goal: Improved Sleep (Psychotic Signs/Symptoms)  Outcome: Ongoing, Progressing     Problem: Social, Occupational or Functional Impairment (Psychotic Signs/Symptoms)  Goal: Enhanced Social, Occupational or Functional Skills (Psychotic Signs/Symptoms)  Outcome: Ongoing, Progressing     Problem: Activity and Energy Impairment (Excessive Substance Use)  Goal: Optimized Energy Level (Excessive Substance Use)  Outcome: Ongoing, Progressing     Problem: Behavior Regulation Impairment (Excessive Substance Use)  Goal: Improved Behavioral Control (Excessive Substance Use)  Outcome: Ongoing, Progressing     Problem: Decreased Participation and Engagement (Excessive Substance Use)  Goal: Increased Participation and Engagement (Excessive Substance Use)  Outcome: Ongoing, Progressing     Problem: Physiologic Impairment (Excessive Substance Use)  Goal: Improved Physiologic Symptoms (Excessive Substance Use)  Outcome: Ongoing,  Progressing     Problem: Social, Occupational or Functional Impairment (Excessive Substance Use)  Goal: Enhanced Social, Occupational or Functional Skills (Excessive Substance Use)  Outcome: Ongoing, Progressing

## 2023-07-04 PROCEDURE — 11400000 HC PSYCH PRIVATE ROOM

## 2023-07-04 PROCEDURE — 25000003 PHARM REV CODE 250: Performed by: PSYCHIATRY & NEUROLOGY

## 2023-07-04 RX ADMIN — HALOPERIDOL 5 MG: 5 TABLET ORAL at 08:07

## 2023-07-04 RX ADMIN — ACETAMINOPHEN 650 MG: 325 TABLET, FILM COATED ORAL at 07:07

## 2023-07-04 RX ADMIN — ACETAMINOPHEN 650 MG: 325 TABLET, FILM COATED ORAL at 05:07

## 2023-07-04 RX ADMIN — BENZTROPINE MESYLATE 1 MG: 1 TABLET ORAL at 08:07

## 2023-07-04 NOTE — NURSING
"Daily Nursing Note:      Behavior:    Patient (Libia Escalona is a 27 y.o. male, : 1995, MRN: [id number]) demonstrating an affect that was flat. Libia demonstrating mood that is anxious. Libia had an appearance that was disheveled and poor hygiene. Libia denies suicidal ideation. Libia denies suicide plan. Libia denies homicidal ideation. Libia endorses hallucinations.    Libia's  height is 5' 6" (1.676 m) and weight is 49.1 kg (108 lb 3.9 oz). His temperature is 98.8 °F (37.1 °C). His blood pressure is 107/52 (abnormal) and his pulse is 91. His respiration is 18 and oxygen saturation is 98%.     Carmens last BM was noted on: _2023  ______      Intervention:    Encourage Libia to perform self-hygiene, grooming, and changing of clothing. Monitor Libia's behavior and program compliance. Monitor Libia for suicidal ideation, homicidal ideation, sleep disturbance, and hallucinations. Encourage Libia to eat all portions of meals and assess for meal preferences. Monitor Cordtyler for intake and output to ensure hydration. Notify the Physician/Physician Assistant/Advance Practice Registered Nurse (MD/PA/APRN) for any medication refusal and any change in patient condition.      Response:    Libia verbalizes understand of unit process and procedures. Libia reported medications Medication compliance.  Educated on uses of medications. ____.      Plan:     Continue to monitor per MD/PA/APRN orders; maintain patient safety.    "

## 2023-07-04 NOTE — NURSING
"PRN Medication Follow-up Note:    Behavior:    Patient (Libia Escalona is a 27 y.o. male, : 1995, MRN: [id number])     Allergies: Patient has no known allergies.    Carmens  height is 5' 6" (1.676 m) and weight is 49.1 kg (108 lb 3.9 oz). His temperature is 98.8 °F (37.1 °C). His blood pressure is 107/52 (abnormal) and his pulse is 91. His respiration is 18 and oxygen saturation is 98%.     Administered __ Tylenol_ 650 mg.,____ per physician order to Libia       Intervention:    Intervention to Libia's response: _    Administer medication as ordered.________.       Response:    Libia's response: Decreasing pain, PS 5/10.________      Plan:     Continue to monitor per MD/PA/APRN orders; and reevaluate medication effectiveness within 30 minutes.    "

## 2023-07-04 NOTE — NURSING
"PRN Administration Note:    Behavior:    Patient (Libia Escalona is a 27 y.o. male, : 1995, MRN: [id number])     Allergies: Patient has no known allergies.    Libia's  height is 5' 6" (1.676 m) and weight is 49.1 kg (108 lb 3.9 oz). His temperature is 98.8 °F (37.1 °C). His blood pressure is 107/52 (abnormal) and his pulse is 91. His respiration is 18 and oxygen saturation is 98%.     Reason for PRN Administration: __ Complaints of headache, PS 7/10.________.    Intervention:    Administered Tylenol 650 mg.,_______ per physician order to Libia       Response:    Libia tolerated administration well.      Plan:     Continue to monitor per MD/PA/APRN orders; and reevaluate medication effectiveness within 30 minutes.    "

## 2023-07-04 NOTE — PLAN OF CARE
Problem: Behavior Regulation Impairment (Psychotic Signs/Symptoms)  Goal: Improved Behavioral Control (Psychotic Signs/Symptoms)  Outcome: Ongoing, Progressing     Problem: Decreased Participation and Engagement (Psychotic Signs/Symptoms)  Goal: Increased Participation and Engagement (Psychotic Signs/Symptoms)  Outcome: Ongoing, Progressing     Problem: Mood Impairment (Psychotic Signs/Symptoms)  Goal: Improved Mood Symptoms (Psychotic Signs/Symptoms)  Outcome: Ongoing, Progressing     Problem: Psychomotor Impairment (Psychotic Signs/Symptoms)  Goal: Improved Psychomotor Symptoms (Psychotic Signs/Symptoms)  Outcome: Ongoing, Progressing     Problem: Sensory Perception Impairment (Psychotic Signs/Symptoms)  Goal: Decreased Sensory Symptoms (Psychotic Signs/Symptoms)  Outcome: Ongoing, Progressing     Problem: Sleep Disturbance (Psychotic Signs/Symptoms)  Goal: Improved Sleep (Psychotic Signs/Symptoms)  Outcome: Ongoing, Progressing

## 2023-07-04 NOTE — NURSING
"Daily Nursing Note:      Behavior:    Patient (Libia Escalona is a 27 y.o. male, : 1995, MRN: [id number]) demonstrating an affect that was flat and anxious. Libia demonstrating mood that is anxious. Libia had an appearance that was disheveled. Libia denies suicidal ideation. Libia denies suicide plan. Libia denies homicidal ideation. Libia denies hallucinations.    Libia's  height is 5' 6" (1.676 m) and weight is 49.1 kg (108 lb 3.9 oz). His temperature is 97.8 °F (36.6 °C). His blood pressure is 115/71 and his pulse is 79. His respiration is 18 and oxygen saturation is 95%.     Carmens last BM was noted on: 23___      Intervention:    Encourage Libia to perform self-hygiene, grooming, and changing of clothing. Monitor Libia's behavior and program compliance. Monitor Libia for suicidal ideation, homicidal ideation, sleep disturbance, and hallucinations. Encourage Libia to eat all portions of meals and assess for meal preferences. Monitor Cordez for intake and output to ensure hydration. Notify the Physician/Physician Assistant/Advance Practice Registered Nurse (MD/PA/APRN) for any medication refusal and any change in patient condition.      Response:    Libia verbalizes understand of unit process and procedures.      Plan:     Continue to monitor per MD/PA/APRN orders; maintain patient safety.    "

## 2023-07-04 NOTE — PROGRESS NOTES
7/4/2023  Libia Escalona   1995   [id number]        Psychiatry Progress Note     Chief Complaint: Patient mumbled    SUBJECTIVE:   Libia Escalona is a 27 y.o. male placed under a PEC at [facility] after being found in his yard acting bizzarely and licking himself.    Odd behavior remains this morning. Patient continues to rotate arms and then lick his arms repetitively. Continues to be soft-spoken.  Tolerating his medication well without issue. States that he is sleeping and eating well. Will continue with current POC.     UDS: (-)  Blood alcohol: <10       Current Medications:   Scheduled Meds:    benztropine  1 mg Oral BID    haloperidoL  5 mg Oral BID    nicotine  1 patch Transdermal Daily      PRN Meds: acetaminophen, aluminum-magnesium hydroxide-simethicone, diphenhydrAMINE, diphenhydrAMINE, haloperidol lactate, haloperidoL, hydrOXYzine HCL, lorazepam, traZODone   Psychotherapeutics (From admission, onward)      Start     Stop Route Frequency Ordered    07/03/23 1030  haloperidoL tablet 5 mg         -- Oral 2 times daily 07/03/23 0920    06/29/23 1453  haloperidol lactate injection 10 mg         -- IM Every 6 hours PRN 06/29/23 1453    06/29/23 1453  haloperidoL tablet 10 mg         -- Oral Every 6 hours PRN 06/29/23 1453    06/29/23 1453  LORazepam injection 2 mg         -- IM Every 4 hours PRN 06/29/23 1453    06/29/23 1453  traZODone tablet 100 mg         -- Oral Nightly PRN 06/29/23 1453            Allergies:   Review of patient's allergies indicates:  No Known Allergies     OBJECTIVE:   Vitals   Vitals:    07/04/23 0700   BP: (!) 107/52   Pulse: 91   Resp: 18   Temp: 98.8 °F (37.1 °C)        Labs/Imaging/Studies:   No results found for this or any previous visit (from the past 36 hour(s)).       Medical Review Of Systems:  Constitutional: negative  Respiratory: negative  Cardiovascular: negative  Gastrointestinal: negative  Genitourinary:negative  Musculoskeletal:negative  Neurological:  negative      Psychiatric Mental Status Exam:  General Appearance: disheveled  Arousal: alert  Behavior: guarded  Movements and Motor Activity: no abnormal involuntary movements noted  Orientation: oriented to person  Speech: brief responses  Mood: Paranoid  Affect: mood-congruent, constricted  Thought Process: slowed, delayed  Associations: Grossly intact  Thought Content and Perceptions: (+)paranoid delusions, (+)auditory hallucinations, (+)responding to internal stimuli, no suicidal ideation, no homicidal ideation  Recent and Remote Memory: recent memory intact, remote memory intact; per interview/observation with patient  Attention and Concentration: limited, attentive to conversation; per interview/observation with patient  Fund of Knowledge: limited; based on history, vocabulary, fund of knowledge, syntax, grammar, and content  Insight: questionable; based on understanding of severity of illness and HPI  Judgment: questionable; based on patient's behavior and HPI      ASSESSMENT/PLAN:   Problems Addressed/Diagnoses:  Schizophrenia, paranoid type(F20.0)  Cannabis use, 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     Plan:  Schizophrenia  -D/c Zyprexa  -Haldol 5mg BID  -Cogentin 1mg BID    Cannabis  -Group/Individual psychotherapy        Expected Disposition Plan: Home        MARGOT Orellana-BC

## 2023-07-05 PROCEDURE — 11400000 HC PSYCH PRIVATE ROOM

## 2023-07-05 PROCEDURE — 25000003 PHARM REV CODE 250: Performed by: PSYCHIATRY & NEUROLOGY

## 2023-07-05 RX ORDER — HALOPERIDOL 5 MG/1
10 TABLET ORAL 2 TIMES DAILY
Status: DISCONTINUED | OUTPATIENT
Start: 2023-07-05 | End: 2023-07-07 | Stop reason: HOSPADM

## 2023-07-05 RX ADMIN — HALOPERIDOL 5 MG: 5 TABLET ORAL at 08:07

## 2023-07-05 RX ADMIN — HALOPERIDOL 10 MG: 5 TABLET ORAL at 08:07

## 2023-07-05 RX ADMIN — ACETAMINOPHEN 650 MG: 325 TABLET, FILM COATED ORAL at 10:07

## 2023-07-05 RX ADMIN — BENZTROPINE MESYLATE 1 MG: 1 TABLET ORAL at 08:07

## 2023-07-05 NOTE — CARE UPDATE
Treatment Team    Pt seem for treatment team today with interdisciplinary team.  Pt is Cooperative and Anxious with Tx team. Pt denies symptoms at this time. Pt was observed actively responding to internal stimuli. MD did not change pt meds at this time. Treatment teams goals Not met at this time. Pt DC plan is home. Pt reported returning home to mother's address. DC date scheduled for 7.10.2023.

## 2023-07-05 NOTE — PLAN OF CARE
Problem: Behavior Regulation Impairment (Psychotic Signs/Symptoms)  Goal: Improved Behavioral Control (Psychotic Signs/Symptoms)  Outcome: Ongoing, Not Progressing     Problem: Cognitive Impairment (Psychotic Signs/Symptoms)  Goal: Optimal Cognitive Function (Psychotic Signs/Symptoms)  Outcome: Ongoing, Not Progressing     Problem: Decreased Participation and Engagement (Psychotic Signs/Symptoms)  Goal: Increased Participation and Engagement (Psychotic Signs/Symptoms)  Outcome: Ongoing, Not Progressing     Problem: Mood Impairment (Psychotic Signs/Symptoms)  Goal: Improved Mood Symptoms (Psychotic Signs/Symptoms)  Outcome: Ongoing, Not Progressing     Problem: Psychomotor Impairment (Psychotic Signs/Symptoms)  Goal: Improved Psychomotor Symptoms (Psychotic Signs/Symptoms)  Outcome: Ongoing, Not Progressing     Problem: Sensory Perception Impairment (Psychotic Signs/Symptoms)  Goal: Decreased Sensory Symptoms (Psychotic Signs/Symptoms)  Outcome: Ongoing, Not Progressing     Problem: Sleep Disturbance (Psychotic Signs/Symptoms)  Goal: Improved Sleep (Psychotic Signs/Symptoms)  Outcome: Ongoing, Not Progressing     Problem: Social, Occupational or Functional Impairment (Psychotic Signs/Symptoms)  Goal: Enhanced Social, Occupational or Functional Skills (Psychotic Signs/Symptoms)  Outcome: Ongoing, Not Progressing     Problem: Activity and Energy Impairment (Excessive Substance Use)  Goal: Optimized Energy Level (Excessive Substance Use)  Outcome: Ongoing, Not Progressing     Problem: Behavior Regulation Impairment (Excessive Substance Use)  Goal: Improved Behavioral Control (Excessive Substance Use)  Outcome: Ongoing, Not Progressing     Problem: Decreased Participation and Engagement (Excessive Substance Use)  Goal: Increased Participation and Engagement (Excessive Substance Use)  Outcome: Ongoing, Not Progressing     Problem: Physiologic Impairment (Excessive Substance Use)  Goal: Improved Physiologic Symptoms  (Excessive Substance Use)  Outcome: Ongoing, Not Progressing     Problem: Social, Occupational or Functional Impairment (Excessive Substance Use)  Goal: Enhanced Social, Occupational or Functional Skills (Excessive Substance Use)  Outcome: Ongoing, Not Progressing     Problem: Violence Risk or Actual  Goal: Anger and Impulse Control  Outcome: Ongoing, Not Progressing

## 2023-07-05 NOTE — NURSING
Treatment Team Note:      Behavior:    Patient (Libia Escalona is a 27 y.o. male, : 1995, MRN: [id number]) demonstrating an affect that was flat. Libia demonstrating mood that is depressed. Libia had an appearance that was clean. Libia denies suicidal ideation. Libia denies suicide plan. Libia denies hallucinations.      Intervention:    Encourage Libia to perform self-hygiene, grooming, and changing of clothing. Encourage Libia to attend all scheduled groups. Monitor Libia's behavior and program compliance. Monitor Libia for suicidal ideation, homicidal ideation, sleep disturbance, and hallucinations. Encourage Libia to eat all portions of meals and assess for meal preferences. Monitor Cordez for intake and output to ensure hydration. Notify the Physician/Physician Assistant/Advance Practice Registered Nurse (MD/PA/APRN) for any medication refusal and any change in patient condition.    Discussed with Jorge Albertotyler course of treatment. Discussed with Libia medications ordered and schedule of medications. Discussed collateral contact with Libia.      Response:    Libia's response to treatment team meeting was cooperative. Noted Libia with repeat  movements and licking his arm at time.  When asked if any concerns, Libia stated no.        Plan:     Continue to monitor per MD/PA/APRN orders; maintain patient safety.

## 2023-07-05 NOTE — NURSING
"PRN Administration Note:    Behavior:    Patient (Libia Escalona is a 27 y.o. male, : 1995, MRN: [id number])     Allergies: Patient has no known allergies.    Libia's  height is 5' 6" (1.676 m) and weight is 49.1 kg (108 lb 3.9 oz). His temperature is 98.8 °F (37.1 °C). His blood pressure is 107/52 (abnormal) and his pulse is 91. His respiration is 18 and oxygen saturation is 98%.     Reason for PRN Administration: Complaints of headache, PS 6/10._________.    Intervention:    Administered _ Tylenol 650 mg.,______ per physician order to Libia       Response:    Libia tolerated administration well.      Plan:     Continue to monitor per MD/PA/APRN orders; and reevaluate medication effectiveness within 30 minutes.    "

## 2023-07-05 NOTE — NURSING
"Daily Nursing Note:      Behavior:    Patient (Libia Escalona is a 27 y.o. male, : 1995, MRN: [id number]) demonstrating an affect that was flat. Libia demonstrating mood that is anxious. Libia had an appearance that was disheveled and poor hygiene. Libia denies suicidal ideation. Libia denies suicide plan. Libia denies homicidal ideation. Libia endorses hallucinations.    Libia's  height is 5' 6" (1.676 m) and weight is 49.1 kg (108 lb 3.9 oz). His temperature is 98.8 °F (37.1 °C). His blood pressure is 107/52 (abnormal) and his pulse is 91. His respiration is 18 and oxygen saturation is 98%.     Carmens last BM was noted on: __2023  _____      Intervention:    Encourage Libia to perform self-hygiene, grooming, and changing of clothing. Monitor Libia's behavior and program compliance. Monitor Libia for suicidal ideation, homicidal ideation, sleep disturbance, and hallucinations. Encourage Libia to eat all portions of meals and assess for meal preferences. Monitor Libia for intake and output to ensure hydration. Notify the Physician/Physician Assistant/Advance Practice Registered Nurse (MD/PA/APRN) for any medication refusal and any change in patient condition.      Response:    Libia verbalizes understand of unit process and procedures. Libia reported medications compliance.  Educated on uses of medications prescribed to him._____.      Plan:     Continue to monitor per MD/PA/APRN orders; maintain patient safety.    "

## 2023-07-05 NOTE — PROGRESS NOTES
7/5/2023  Libia Escalona   1995   [id number]        Psychiatry Progress Note     Chief Complaint: Patient mumbled    SUBJECTIVE:   Libia Escalona is a 27 y.o. male placed under a PEC at [facility] after being found in his yard acting bizzarely and licking himself.    Staff reports that his behavior has remained unchanged.  Still bizarre.  Frequently licking his arms.  Will titrate Haldol again today.    Quiet and soft-spoken per staff.  Denies auditory and visual hallucinations, however, seen actively responding to internal stimuli.  Licked his arms several times during treatment team.  (+)Stereotypical movements.    UDS: (-)  Blood alcohol: <10       Current Medications:   Scheduled Meds:    benztropine  1 mg Oral BID    haloperidoL  5 mg Oral BID    nicotine  1 patch Transdermal Daily      PRN Meds: acetaminophen, aluminum-magnesium hydroxide-simethicone, diphenhydrAMINE, diphenhydrAMINE, haloperidol lactate, haloperidoL, hydrOXYzine HCL, lorazepam, traZODone   Psychotherapeutics (From admission, onward)      Start     Stop Route Frequency Ordered    07/03/23 1030  haloperidoL tablet 5 mg         -- Oral 2 times daily 07/03/23 0920    06/29/23 1453  haloperidol lactate injection 10 mg         -- IM Every 6 hours PRN 06/29/23 1453    06/29/23 1453  haloperidoL tablet 10 mg         -- Oral Every 6 hours PRN 06/29/23 1453    06/29/23 1453  LORazepam injection 2 mg         -- IM Every 4 hours PRN 06/29/23 1453    06/29/23 1453  traZODone tablet 100 mg         -- Oral Nightly PRN 06/29/23 1453            Allergies:   Review of patient's allergies indicates:  No Known Allergies     OBJECTIVE:   Vitals   Vitals:    07/04/23 1443   BP: (!) 107/52   Pulse: 91   Resp: 18   Temp: 98.8 °F (37.1 °C)        Labs/Imaging/Studies:   No results found for this or any previous visit (from the past 36 hour(s)).       Medical Review Of Systems:  Constitutional: negative  Respiratory: negative  Cardiovascular:  negative  Gastrointestinal: negative  Genitourinary:negative  Musculoskeletal:negative  Neurological: negative      Psychiatric Mental Status Exam:  General Appearance: disheveled  Arousal: alert  Behavior: guarded  Movements and Motor Activity: (+)Stereotypical movements noted  Orientation: oriented to person  Speech: brief responses  Mood: Paranoid  Affect: mood-congruent, constricted  Thought Process: slowed, delayed  Associations: Grossly intact  Thought Content and Perceptions: (+)paranoid delusions, (+)auditory hallucinations, (+)responding to internal stimuli, no suicidal ideation, no homicidal ideation  Recent and Remote Memory: recent memory intact, remote memory intact; per interview/observation with patient  Attention and Concentration: limited, attentive to conversation; per interview/observation with patient  Fund of Knowledge: limited; based on history, vocabulary, fund of knowledge, syntax, grammar, and content  Insight: questionable; based on understanding of severity of illness and HPI  Judgment: questionable; based on patient's behavior and HPI      ASSESSMENT/PLAN:   Problems Addressed/Diagnoses:  Schizophrenia, paranoid type (F20.0)  Stimulant (synthetic cannabinoid) use disorder (F15.20)  Cannabis use, 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     Plan:  Schizophrenia  -Increase Haldol to 10mg BID  -Continue Cogentin    Cannabis  -Group/Individual psychotherapy        Expected Disposition Plan: Home        Ceasar Newman M.D.

## 2023-07-05 NOTE — PLAN OF CARE
Libia attends TR groups, appears to be responding to internal stimuli with bizarre psychomotor hand gestures, does not interact with peers, minimal with staff, is soft spoken, polite, and restless wandering in and out of groups, and attends his ADL's. Libia attended treatment team, pleasant & cooperative with +RIS bizarre hand gestures and body licking.

## 2023-07-05 NOTE — GROUP NOTE
Group Psychotherapy       Group Focus: Psychodynamic Group Psychotherapy      Number of patients in attendance: 13    Group Start Time: 2030  Group End Time:  2100  Groups Date: 7/5/2023  Group Topic:  Behavioral Health  Group Department: [facility]  Group Facilitators:  Ana Betancur RN  _____________________________________________________________________    Patient Name: Libia Escalona  MRN: [id number]  Patient Class: IP- Psych   Admission Date\Time: 6/29/2023  2:44 PM  Hospital Length of Stay: 6  Primary Care Provider: Rolando Da Silva Jr, MD     Referred by: {PATRICIA ZHENG REFERRED BY:[id number]}     Target symptoms: {Target Symptoms:88207}     Patient's response to treatment: {PSY IP PATIENT'S RESPONSE:08811}     Progress toward goals: {PSY IP PROGRESS TOWARD GOALS:64208}     Interval History: ***     Diagnosis: ***     Plan: {PSY IP PLAN:12026}

## 2023-07-05 NOTE — GROUP NOTE
Group Psychotherapy       Group Focus: Psychodynamic Group Psychotherapy      Number of patients in attendance: 13    Group Start Time: 2030  Group End Time:  2100  Groups Date: 7/5/2023  Group Topic:  Behavioral Health  Group Department: [facility]  Group Facilitators:  Ana Betancur RN  _____________________________________________________________________    Patient Name: Libia Escalona  MRN: [id number]  Patient Class: IP- Psych   Admission Date\Time: 6/29/2023  2:44 PM  Hospital Length of Stay: 6  Primary Care Provider: Rolando Da Silva Jr, MD     Referred by: Behavioral Medicine Unit Treatment Team     Target symptoms: Psychosis     Patient's response to treatment: Active Listening     Progress toward goals: Progressing adequately     Interval History:      Diagnosis: Psychosis     Plan: Continue treatment on [facility]

## 2023-07-05 NOTE — NURSING
"PRN Medication Follow-up Note:    Behavior:    Patient (Libia Escalona is a 27 y.o. male, : 1995, MRN: [id number])     Allergies: Patient has no known allergies.    Carmens  height is 5' 6" (1.676 m) and weight is 49.1 kg (108 lb 3.9 oz). His temperature is 98.8 °F (37.1 °C). His blood pressure is 107/52 (abnormal) and his pulse is 91. His respiration is 18 and oxygen saturation is 98%.     Administered Tylenol 650 mg.,_______ per physician order to Libia       Intervention:    Intervention to Libia's response: Administer medication as ordered.________.       Response:    Libia's response: __ Decreasing pain, PS 4/10._______      Plan:     Continue to monitor per MD/PA/APRN orders; and reevaluate medication effectiveness within 30 minutes.    "

## 2023-07-06 PROCEDURE — 11400000 HC PSYCH PRIVATE ROOM

## 2023-07-06 PROCEDURE — 25000003 PHARM REV CODE 250: Performed by: PSYCHIATRY & NEUROLOGY

## 2023-07-06 RX ADMIN — BENZTROPINE MESYLATE 1 MG: 1 TABLET ORAL at 08:07

## 2023-07-06 RX ADMIN — HALOPERIDOL 10 MG: 5 TABLET ORAL at 08:07

## 2023-07-06 RX ADMIN — ACETAMINOPHEN 650 MG: 325 TABLET, FILM COATED ORAL at 08:07

## 2023-07-06 NOTE — NURSING
"Daily Nursing Note:      Behavior:    Patient (Libia Escalona is a 27 y.o. male, : 1995, MRN: [id number]) demonstrating an affect that was flat and anxious. Libia demonstrating mood that is anxious. Libia had an appearance that was poor hygiene. Libia denies suicidal ideation. Libia denies suicide plan. Libia denies homicidal ideation. Libia endorses hallucinations.    Libia's  height is 5' 6" (1.676 m) and weight is 49.1 kg (108 lb 3.9 oz). His temperature is 98.8 °F (37.1 °C). His blood pressure is 107/52 (abnormal) and his pulse is 91. His respiration is 18 and oxygen saturation is 98%.     Carmens last BM was noted on: _______      Intervention:    Encourage Libia to perform self-hygiene, grooming, and changing of clothing. Monitor Libia's behavior and program compliance. Monitor Libia for suicidal ideation, homicidal ideation, sleep disturbance, and hallucinations. Encourage Libia to eat all portions of meals and assess for meal preferences. Monitor Cordez for intake and output to ensure hydration. Notify the Physician/Physician Assistant/Advance Practice Registered Nurse (MD/PA/APRN) for any medication refusal and any change in patient condition.      Response:    Libia verbalizes understand of unit process and procedures. Libia reported medications ______.      Plan:     Continue to monitor per MD/PA/APRN orders; maintain patient safety.   "

## 2023-07-06 NOTE — PROGRESS NOTES
Inpatient Nutrition Evaluation    Admit Date: 6/29/2023   Total duration of encounter: 7 days    Nutrition Recommendation/Prescription   Recommend continue double portion regular diet. Offer additional snacks between meals.     Recommend adding Boost Plus (provides 360 kcal, 14 g protein per serving) BID.    Dietitian will monitor food and beverage intake and weight change.    Nutrition Assessment     Chart Review    Reason Seen: length of stay    Malnutrition Screening Tool Results                Diagnosis:  Psychosis    Relevant Medical History: No past medical history on file, current problem list: Schizophrenia, paranoid type (F20.0), Stimulant (synthetic cannabinoid) use disorder (F15.20), Cannabis use, in remission (F12.21)    Nutrition-Related Medications:    benztropine  1 mg Oral BID    haloperidoL  10 mg Oral BID    nicotine  1 patch Transdermal Daily         acetaminophen, aluminum-magnesium hydroxide-simethicone, diphenhydrAMINE, diphenhydrAMINE, haloperidol lactate, haloperidoL, hydrOXYzine HCL, lorazepam, traZODone   Calorie Containing IV Medications: no significant kcals from medications at this time    Nutrition-Related Labs:  No results for input(s): WBC, RBC, HGB, HCT, MCV, MCH, MCHC in the last 168 hours.  No results for input(s): NA, K, CL, CO2, ANIONGAP, BUN, CREATININE, GLU, CALCIUM, PH, MG, ALBUMIN, PROT, ALKPHOS, ALT, AST, BILITOT in the last 168 hours.    Diet Order: Diet Adult Regular Double Portions  Oral Supplement Order: none  Appetite/Oral Intake: good/% of meals  Factors Affecting Nutritional Intake: none identified  Food/Nondenominational/Cultural Preferences: none reported  Food Allergies: none reported       Wound(s):   none noted    Comments  7/6/23: Pt had a friendly affect and remained seated during nutrition assessment. Pt was able to answer most questions. Pt did repeatedly lick his arms during visit. Pt reports good appetite during admission. Eating 100% of food served. Prior to  "admission, pt lived with mom who prepared most of his meals. Reports eating 3 times a day at home; common foods included rice, vegetables, and meat. Denies nausea, vomiting, diarrhea, and constipation. Daily bowel movement. Takes no supplements at home. Reported usual body weight of 132 lbs, but does not know how long it has been since weighing that amount. Weight at admission 23 108.25 lbs, weight on standing scale this afternoon 115 lbs. 12.3% unintentional weight loss over unspecified amount of time but patient has regained 6.75 lbs during admission. Pt was encouraged by weight gain, would like to get back to [facility]. Patient agreed to vanilla boost supplement.     Anthropometrics    Height: 5' 6" (167.6 cm) Height Method: Stated  Last Weight: 52.2 kg (115 lb) (23 1523) Weight Method: Standard Scale  BMI (Calculated): 18.6  BMI Classification: normal (BMI 18.5-24.9)        Ideal Body Weight (IBW), Male: 142 lb     % Ideal Body Weight, Male (lb): 76.23 %                 Usual Body Weight (UBW), k.9 kg  % Usual Body Weight: 87.27  % Weight Change From Usual Weight: -12.92 %  Usual Weight Provided By: patient    Wt Readings from Last 5 Encounters:   23 52.2 kg (115 lb)     Weight Change(s) Since Admission:  Admit Weight: 49.1 kg (108 lb 3.9 oz) (23 1444)  Pt is unsure the last time he weighed this amount, but states the unintentional weight loss happened over "some time."    Patient Education    Not applicable.    Monitoring & Evaluation     Dietitian will monitor food and beverage intake and weight change.  Nutrition Risk/Follow-Up: low (follow-up in 5-7 days)  Patients assigned 'low nutrition risk' status do not qualify for a full nutritional assessment but will be monitored and re-evaluated in a 5-7 day time period. Please consult if re-evaluation needed sooner.    "

## 2023-07-06 NOTE — NURSING
"Daily Nursing Note:      Behavior:    Patient (Libia Escalona is a 27 y.o. male, : 1995, MRN: [id number]) demonstrating an affect that was flat. Libia demonstrating mood that is depressed. Libia had an appearance that was disheveled. Libia denies suicidal ideation. Libia denies suicide plan. Libia denies homicidal ideation. Libia denies hallucinations.    Libia's  height is 5' 6" (1.676 m) and weight is 49.1 kg (108 lb 3.9 oz). His temperature is 98.8 °F (37.1 °C). His blood pressure is 107/52 (abnormal) and his pulse is 91. His respiration is 18 and oxygen saturation is 98%.         Intervention:    Encourage Libia to perform self-hygiene, grooming, and changing of clothing. Monitor Libia's behavior and program compliance. Monitor Cordtyler for suicidal ideation, homicidal ideation, sleep disturbance, and hallucinations. Encourage Libia to eat all portions of meals and assess for meal preferences. Monitor Cordez for intake and output to ensure hydration. Notify the Physician/Physician Assistant/Advance Practice Registered Nurse (MD/PA/APRN) for any medication refusal and any change in patient condition.      Response:        Plan:     Continue to monitor per MD/PA/APRN orders; maintain patient safety.   "

## 2023-07-06 NOTE — PROGRESS NOTES
7/6/2023  Libia Escalona   1995   [id number]        Psychiatry Progress Note     Chief Complaint: Patient mumbled    SUBJECTIVE:   Libia Escalona is a 27 y.o. male placed under a PEC at [facility] after being found in his yard acting bizzarely and licking himself.    Staff reports that his behavior has remained unchanged.  Still bizarre.  Continues to frequently licking his arms. Tolerating his medications well without issue. Patient does remain flat but is calm and cooperative. He is speaking with me more than previously which may indicate improvement in overall function but I believe he may be close to his baseline behavior at this time. Denies auditory and visual hallucinations, however, continues to be seen actively responding to internal stimuli by staff.  Licked and rotated his arms several times during treatment team.  (+) Stereotypical movements.Will continue with current POC.      UDS: (-)  Blood alcohol: <10       Current Medications:   Scheduled Meds:    benztropine  1 mg Oral BID    haloperidoL  10 mg Oral BID    nicotine  1 patch Transdermal Daily      PRN Meds: acetaminophen, aluminum-magnesium hydroxide-simethicone, diphenhydrAMINE, diphenhydrAMINE, haloperidol lactate, haloperidoL, hydrOXYzine HCL, lorazepam, traZODone   Psychotherapeutics (From admission, onward)      Start     Stop Route Frequency Ordered    07/05/23 2100  haloperidoL tablet 10 mg         -- Oral 2 times daily 07/05/23 0927    06/29/23 1453  haloperidol lactate injection 10 mg         -- IM Every 6 hours PRN 06/29/23 1453    06/29/23 1453  haloperidoL tablet 10 mg         -- Oral Every 6 hours PRN 06/29/23 1453    06/29/23 1453  LORazepam injection 2 mg         -- IM Every 4 hours PRN 06/29/23 1453    06/29/23 1453  traZODone tablet 100 mg         -- Oral Nightly PRN 06/29/23 1453            Allergies:   Review of patient's allergies indicates:  No Known Allergies     OBJECTIVE:   Vitals   Vitals:    07/04/23 1443   BP: (!)  107/52   Pulse: 91   Resp: 18   Temp: 98.8 °F (37.1 °C)        Labs/Imaging/Studies:   No results found for this or any previous visit (from the past 36 hour(s)).       Medical Review Of Systems:  Constitutional: negative  Respiratory: negative  Cardiovascular: negative  Gastrointestinal: negative  Genitourinary:negative  Musculoskeletal:negative  Neurological: negative      Psychiatric Mental Status Exam:  General Appearance: disheveled  Arousal: alert  Behavior: guarded  Movements and Motor Activity: (+)Stereotypical movements noted  Orientation: oriented to person  Speech: brief responses  Mood: Paranoid  Affect: mood-congruent, constricted  Thought Process: slowed, delayed  Associations: Grossly intact  Thought Content and Perceptions: (+)paranoid delusions, (+)auditory hallucinations, (+)responding to internal stimuli, no suicidal ideation, no homicidal ideation  Recent and Remote Memory: recent memory intact, remote memory intact; per interview/observation with patient  Attention and Concentration: limited, attentive to conversation; per interview/observation with patient  Fund of Knowledge: limited; based on history, vocabulary, fund of knowledge, syntax, grammar, and content  Insight: questionable; based on understanding of severity of illness and HPI  Judgment: questionable; based on patient's behavior and HPI      ASSESSMENT/PLAN:   Problems Addressed/Diagnoses:  Schizophrenia, paranoid type (F20.0)  Stimulant (synthetic cannabinoid) use disorder (F15.20)  Cannabis use, 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     Plan:  Schizophrenia  -Continue Haldol to 10mg BID  -Continue Cogentin    Cannabis  -Group/Individual psychotherapy        Expected Disposition Plan: Home        MARGOT Orellana-BC

## 2023-07-06 NOTE — GROUP NOTE
Group Psychotherapy       Group Focus: Psychiatric Medication Education      Number of patients in attendance: 9    Group Start Time: 2030  Group End Time:  2100  Groups Date: 7/5/2023  Group Topic:  Behavioral Health  Group Department: [facility]  Group Facilitators:  Dianne Kaplan RN  _____________________________________________________________________    Patient Name: Libia Escalona  MRN: [id number]  Patient Class: IP- Psych   Admission Date\Time: 6/29/2023  2:44 PM  Hospital Length of Stay: 6  Primary Care Provider: Rolando Da Silva Jr, MD     Referred by: Acute Psychiatry Unit Treatment Team     Target symptoms: Psychosis     Patient's response to treatment: Not Participating     Progress toward goals: Progressing slowly     Interval History:      Diagnosis: Psychotic Disorder, NOS; Unknown Substance Abuse     Plan: Continue treatment on [facility]

## 2023-07-06 NOTE — PLAN OF CARE
Problem: Behavior Regulation Impairment (Psychotic Signs/Symptoms)  Goal: Improved Behavioral Control (Psychotic Signs/Symptoms)  Outcome: Ongoing, Progressing     Problem: Cognitive Impairment (Psychotic Signs/Symptoms)  Goal: Optimal Cognitive Function (Psychotic Signs/Symptoms)  Outcome: Ongoing, Progressing     Problem: Decreased Participation and Engagement (Psychotic Signs/Symptoms)  Goal: Increased Participation and Engagement (Psychotic Signs/Symptoms)  Outcome: Ongoing, Progressing     Problem: Mood Impairment (Psychotic Signs/Symptoms)  Goal: Improved Mood Symptoms (Psychotic Signs/Symptoms)  Outcome: Ongoing, Progressing     Problem: Psychomotor Impairment (Psychotic Signs/Symptoms)  Goal: Improved Psychomotor Symptoms (Psychotic Signs/Symptoms)  Outcome: Ongoing, Progressing     Problem: Sensory Perception Impairment (Psychotic Signs/Symptoms)  Goal: Decreased Sensory Symptoms (Psychotic Signs/Symptoms)  Outcome: Ongoing, Progressing     Problem: Sleep Disturbance (Psychotic Signs/Symptoms)  Goal: Improved Sleep (Psychotic Signs/Symptoms)  Outcome: Ongoing, Progressing     Problem: Social, Occupational or Functional Impairment (Psychotic Signs/Symptoms)  Goal: Enhanced Social, Occupational or Functional Skills (Psychotic Signs/Symptoms)  Outcome: Ongoing, Progressing     Problem: Activity and Energy Impairment (Excessive Substance Use)  Goal: Optimized Energy Level (Excessive Substance Use)  Outcome: Ongoing, Progressing     Problem: Behavior Regulation Impairment (Excessive Substance Use)  Goal: Improved Behavioral Control (Excessive Substance Use)  Outcome: Ongoing, Progressing     Problem: Physiologic Impairment (Excessive Substance Use)  Goal: Improved Physiologic Symptoms (Excessive Substance Use)  Outcome: Ongoing, Progressing     Problem: Social, Occupational or Functional Impairment (Excessive Substance Use)  Goal: Enhanced Social, Occupational or Functional Skills (Excessive Substance  Use)  Outcome: Ongoing, Progressing     Problem: Violence Risk or Actual  Goal: Anger and Impulse Control  Outcome: Ongoing, Progressing

## 2023-07-07 VITALS
HEART RATE: 83 BPM | DIASTOLIC BLOOD PRESSURE: 82 MMHG | RESPIRATION RATE: 18 BRPM | SYSTOLIC BLOOD PRESSURE: 126 MMHG | HEIGHT: 66 IN | WEIGHT: 115 LBS | TEMPERATURE: 98 F | BODY MASS INDEX: 18.48 KG/M2 | OXYGEN SATURATION: 98 %

## 2023-07-07 PROBLEM — F20.0 PARANOID SCHIZOPHRENIA, CHRONIC CONDITION: Status: ACTIVE | Noted: 2023-07-07

## 2023-07-07 PROBLEM — F29 PSYCHOSIS: Status: RESOLVED | Noted: 2023-07-07 | Resolved: 2023-07-07

## 2023-07-07 PROBLEM — F29 PSYCHOSIS: Status: ACTIVE | Noted: 2023-07-07

## 2023-07-07 PROBLEM — F12.21 CANNABIS DEPENDENCE, IN REMISSION: Status: ACTIVE | Noted: 2023-07-07

## 2023-07-07 PROBLEM — F15.20 METHAMPHETAMINE ADDICTION: Status: ACTIVE | Noted: 2023-07-07

## 2023-07-07 PROCEDURE — 25000003 PHARM REV CODE 250: Performed by: PSYCHIATRY & NEUROLOGY

## 2023-07-07 RX ORDER — HALOPERIDOL 10 MG/1
10 TABLET ORAL 2 TIMES DAILY
Qty: 60 TABLET | Refills: 0 | Status: SHIPPED | OUTPATIENT
Start: 2023-07-07 | End: 2023-08-06

## 2023-07-07 RX ORDER — BENZTROPINE MESYLATE 1 MG/1
1 TABLET ORAL 2 TIMES DAILY
Qty: 60 TABLET | Refills: 0 | Status: SHIPPED | OUTPATIENT
Start: 2023-07-07 | End: 2023-08-06

## 2023-07-07 RX ORDER — IBUPROFEN 200 MG
1 TABLET ORAL DAILY
Qty: 28 PATCH | Refills: 0 | Status: SHIPPED | OUTPATIENT
Start: 2023-07-07 | End: 2023-08-04

## 2023-07-07 RX ADMIN — HALOPERIDOL 10 MG: 5 TABLET ORAL at 09:07

## 2023-07-07 RX ADMIN — BENZTROPINE MESYLATE 1 MG: 1 TABLET ORAL at 09:07

## 2023-07-07 NOTE — DISCHARGE SUMMARY
DISCHARGE SUMMARY  PSYCHIATRY      Admit Date: 6/29/2023  2:44 PM    Discharge Date:  7/7/2023    SITE:   [facility]    Discharge Attending Physician: Ceasar Newman M.D.    Chief Complaint:  Psychosis    History of Present Illness On Admit:   Libia Escalona is a 27 y.o. male placed under a PEC at [facility] after being found in his yard acting bizzarely and licking himself. Patient is a very poor historian with severe poverty of thought. He does appear to be responding to some internal stimuli. He did admit to using synthetic marijuana yesterday prior to these symptoms beginning. I am unsure as to whether there were symptoms before the [place] use as patient was unable to remember. He did state that he has been treated in the past for bipolar disorder but denied being diagnosed with schizophrenia. He states that he has been to a hospital like this before but was unable to recall when this occurred. Patient denies any current medication or psychiatric treatment. Will begin treatment for psychosis but I do believe this to be drug induced. We will monitor for improvement and discuss rehabilitation with him when he is more aware of his situation. Admit for medication management and safety monitoring.       Admit Mental Status Exam:  General Appearance: appears stated age, adequately groomed, dressed in hospital garb, in no acute distress, thin and gaunt  Arousal: alert  Behavior: cooperative, friendly, pleasant, polite, appropriate eye-contact, under good behavioral control, + repetitive behaviors([place] arms and rotating them in succession)  Movements and Motor Activity: no abnormal involuntary movements noted; no tics, no tremors, no akathisia, no dystonia, no evidence of tardive dyskinesia; no psychomotor agitation or retardation  Orientation: intact; oriented fully to person, place, time and situation  Speech: intact; normal rate, rhythm, volume, tone and pitch;  conversational, spontaneous, and coherent  Mood: Anxious and Dysphoric  Affect: constricted, dysphoric, bizarre  Thought Process: circumstantial, difficult to assess due to poverty of thought  Associations: Unable to Assess  Thought Content and Perceptions: no suicidal or homicidal ideation, questionable auditory and/or visual hallucinations, questionable  paranoid ideation, no ideas of reference, questionable evidence of delusions and/or psychosis  Recent and Remote Memory: Unable to Assess; per interview/observation with patient  Attention and Concentration: Unable to Assess; per interview/observation with patient  Fund of Knowledge: Unable to Assess; based on history, vocabulary, fund of knowledge, syntax, grammar, and content  Insight: poor; based on understanding of severity of illness and HPI  Judgment: poor; based on patient's behavior and HPI      Diagnoses:  PRINCIPAL PROBLEM:  Paranoid schizophrenia, chronic condition      PROBLEM LIST    Paranoid schizophrenia, chronic condition    Methamphetamine addiction    Cannabis dependence, in remission        Hospital Course:   Patient was admitted to [facility] and started on Zyprexa 10mg daily.    7/3/23  Odd behavior this morning.  Soft-spoken.  Difficult to follow.  Seen responding to internal stimuli.  Had been on Haldol 5mg daily and 10mg HS earlier this year but has apparently been non-compliant.  Will discontinue Zyprexa and restart Haldol.  Will also restart Cogentin (was on this as well).     UDS: (-)  Blood alcohol: <10    7/4/23  Odd behavior remains this morning. Patient continues to rotate arms and then lick his arms repetitively. Continues to be soft-spoken.  Tolerating his medication well without issue. States that he is sleeping and eating well. Will continue with current POC.     7/5/23  Staff reports that his behavior has remained unchanged.  Still bizarre.  Frequently licking his arms.  Will titrate Haldol again today.     Quiet and soft-spoken per  staff.  Denies auditory and visual hallucinations, however, seen actively responding to internal stimuli.  Licked his arms several times during treatment team.  (+)Stereotypical movements.    7/6/23  Staff reports that his behavior has remained unchanged.  Still bizarre.  Continues to frequently licking his arms. Tolerating his medications well without issue. Patient does remain flat but is calm and cooperative. He is speaking with me more than previously which may indicate improvement in overall function but I believe he may be close to his baseline behavior at this time. Denies auditory and visual hallucinations, however, continues to be seen actively responding to internal stimuli by staff.  Licked and rotated his arms several times during treatment team.  (+) Stereotypical movements.Will continue with current POC.      7/7/23  Staff reports that his behavior has remained unchanged.  Still bizarre.  Continues to frequently licking his arms. Tolerating his medications well without issue. Patient does remain flat but is calm and cooperative. Patient was more interactive with me today and shows slight improvement over yesterday. Denies auditory and visual hallucinations, however, continues to be seen actively responding to internal stimuli by staff. Staff report that he has been pleasant and compliant with medications. Continues to lick his hand but has stopped the repetitive rotation of his arms.  Will continue with current POC and proceed with discharge today.        DISCHARGE EXAMINATION    VITALS   Vitals:    07/04/23 1443 07/06/23 0701 07/06/23 1523 07/07/23 0701   BP: (!) 107/52 119/75  126/82   BP Location:    Left arm   Patient Position:    Sitting   Pulse: 91 79  83   Resp: 18 18  18   Temp: 98.8 °F (37.1 °C) 98.1 °F (36.7 °C)  97.5 °F (36.4 °C)   TempSrc:    Oral   SpO2:  97%  98%   Weight:   52.2 kg (115 lb)    Height:             Discharge Mental Status Exam:  General Appearance: disheveled  Arousal:  alert  Behavior: guarded  Movements and Motor Activity: (+)Stereotypical movements noted  Orientation: oriented to person  Speech: brief responses  Mood: Paranoid  Affect: mood-congruent, constricted  Thought Process: slowed, delayed  Associations: Grossly intact  Thought Content and Perceptions: (+)paranoid delusions, (+)auditory hallucinations, (+)responding to internal stimuli, no suicidal ideation, no homicidal ideation  Recent and Remote Memory: recent memory intact, remote memory intact; per interview/observation with patient  Attention and Concentration: limited, attentive to conversation; per interview/observation with patient  Fund of Knowledge: limited; based on history, vocabulary, fund of knowledge, syntax, grammar, and content  Insight: questionable; based on understanding of severity of illness and HPI  Judgment: questionable; based on patient's behavior and HPI      Discharge Condition:  Stable    Prognosis:  Fair    Justification for >1 antipsychotic:  N/a    Disposition:  discharged to home    Follow-up:  [facility]      Medication Regimen:    Current Facility-Administered Medications:     acetaminophen tablet 650 mg, 650 mg, Oral, Q6H PRN, Ceasar Newman MD, 650 mg at 07/06/23 0824    aluminum-magnesium hydroxide-simethicone 200-200-20 mg/5 mL suspension 30 mL, 30 mL, Oral, Q6H PRN, Ceasar Newman MD    benztropine tablet 1 mg, 1 mg, Oral, BID, Ceasar Newman MD, 1 mg at 07/07/23 0903    diphenhydrAMINE capsule 50 mg, 50 mg, Oral, Q6H PRN, Ceasar Newman MD    diphenhydrAMINE injection 50 mg, 50 mg, Intramuscular, Q6H PRN, Ceasar Newman MD    haloperidol lactate injection 10 mg, 10 mg, Intramuscular, Q6H PRN, Ceasar Newman MD    haloperidoL tablet 10 mg, 10 mg, Oral, Q6H PRN, Ceasar Newman MD    haloperidoL tablet 10 mg, 10 mg, Oral, BID, Ceasar Newman MD, 10 mg at 07/07/23 0903    hydrOXYzine tablet 50 mg, 50 mg, Oral,  Q4H PRN, Ceasar Newman MD    LORazepam injection 2 mg, 2 mg, Intramuscular, Q4H PRN, Ceasar Newman MD    nicotine 21 mg/24 hr 1 patch, 1 patch, Transdermal, Daily, Ceasar Newman MD    traZODone tablet 100 mg, 100 mg, Oral, Nightly PRN, Ceasar Newman MD    Current Outpatient Medications:     benztropine (COGENTIN) 1 MG tablet, Take 1 tablet (1 mg total) by mouth 2 (two) times daily., Disp: 60 tablet, Rfl: 0    haloperidoL (HALDOL) 10 MG tablet, Take 1 tablet (10 mg total) by mouth 2 (two) times daily., Disp: 60 tablet, Rfl: 0    nicotine (NICODERM CQ) 21 mg/24 hr, Place 1 patch onto the skin once daily., Disp: 28 patch, Rfl: 0      Patient Instructions:   Continue medication regimen as prescribed.    Disposition plan per  - see  notes for details.    Patient instructed to call 911 or present to emergency department if any of the following complications develop status post discharge: suicidality, homicidality, or grave disability.     Total time spent discharging patient: <30 minutes      Ceasar Newman M.D.

## 2023-07-07 NOTE — NURSING
Discharge Note:    Libia Escalona is a 27 y.o. male, : 1995, MRN: [id number], admitted on 2023 for Ceasar Newman MD with a diagnosis of Psychosis [F29].    Patient discharged on 2023 per physician orders in stable condition. Patient denied suicidal ideation, homicidal ideation, or hallucinations. Patient was discharged with valuables, personal belongings, prescriptions, discharge instructions, and an educational handout explaining the diagnosis and prescribed medications. Patient verbalized understanding of the discharge instructions and importance of follow-up visits. Patient was escorted out of the facility by [facility] and placed into a private vehicle to be transported to home.     Patient discharged on the following medications:     Medication List        START taking these medications      benztropine 1 MG tablet  Commonly known as: COGENTIN  Take 1 tablet (1 mg total) by mouth 2 (two) times daily.     haloperidoL 10 MG tablet  Commonly known as: HALDOL  Take 1 tablet (10 mg total) by mouth 2 (two) times daily.     nicotine 21 mg/24 hr  Commonly known as: NICODERM CQ  Place 1 patch onto the skin once daily.               Where to Get Your Medications        You can get these medications from any pharmacy    Bring a paper prescription for each of these medications  benztropine 1 MG tablet  haloperidoL 10 MG tablet  nicotine 21 mg/24 hr

## 2023-07-07 NOTE — NURSING
"Daily Nursing Note:      Behavior:    Patient (Libia Escalona is a 27 y.o. male, : 1995, MRN: [id number]) demonstrating an affect that was flat. Libia demonstrating mood that is depressed and anxious. Libia had an appearance that was disheveled. Libia denies suicidal ideation. Libia denies suicide plan. Libia denies homicidal ideation. Libia denies hallucinations.    Libia's  height is 5' 6" (1.676 m) and weight is 52.2 kg (115 lb). His temperature is 98.1 °F (36.7 °C). His blood pressure is 119/75 and his pulse is 79. His respiration is 18 and oxygen saturation is 97%.       Intervention:    Encourage Libia to perform self-hygiene, grooming, and changing of clothing. Monitor Libia's behavior and program compliance. Monitor [place] for suicidal ideation, homicidal ideation, sleep disturbance, and hallucinations. Encourage Libia to eat all portions of meals and assess for meal preferences. Monitor [place] for intake and output to ensure hydration. Notify the Physician/Physician Assistant/Advance Practice Registered Nurse (MD/PA/APRN) for any medication refusal and any change in patient condition.      Response:    Libia verbalizes understand of unit process and procedures.      Plan:     Continue to monitor per MD/PA/APRN orders; maintain patient safety.   "

## 2023-07-07 NOTE — PROGRESS NOTES
7/7/2023  Libia Escalona   1995   [id number]        Psychiatry Progress Note     Chief Complaint: Patient mumbled    SUBJECTIVE:   Libia Escalona is a 27 y.o. male placed under a PEC at [facility] after being found in his yard acting bizzarely and licking himself.    Staff reports that his behavior has remained unchanged.  Still bizarre.  Continues to frequently licking his arms. Tolerating his medications well without issue. Patient does remain flat but is calm and cooperative. Patient was more interactive with me today and shows slight improvement over yesterday. Denies auditory and visual hallucinations, however, continues to be seen actively responding to internal stimuli by staff. Staff report that he has been pleasant and compliant with medications. Continues to lick his hand but has stopped the repetitive rotation of his arms.  Will continue with current POC and proceed with discharge today.      UDS: (-)  Blood alcohol: <10       Current Medications:   Scheduled Meds:    benztropine  1 mg Oral BID    haloperidoL  10 mg Oral BID    nicotine  1 patch Transdermal Daily      PRN Meds: acetaminophen, aluminum-magnesium hydroxide-simethicone, diphenhydrAMINE, diphenhydrAMINE, haloperidol lactate, haloperidoL, hydrOXYzine HCL, lorazepam, traZODone   Psychotherapeutics (From admission, onward)      Start     Stop Route Frequency Ordered    07/05/23 2100  haloperidoL tablet 10 mg         -- Oral 2 times daily 07/05/23 0927    06/29/23 1453  haloperidol lactate injection 10 mg         -- IM Every 6 hours PRN 06/29/23 1453    06/29/23 1453  haloperidoL tablet 10 mg         -- Oral Every 6 hours PRN 06/29/23 1453    06/29/23 1453  LORazepam injection 2 mg         -- IM Every 4 hours PRN 06/29/23 1453    06/29/23 1453  traZODone tablet 100 mg         -- Oral Nightly PRN 06/29/23 1453            Allergies:   Review of patient's allergies indicates:  No Known Allergies     OBJECTIVE:   Vitals   Vitals:    07/06/23  0701   BP: 119/75   Pulse: 79   Resp: 18   Temp: 98.1 °F (36.7 °C)        Labs/Imaging/Studies:   No results found for this or any previous visit (from the past 36 hour(s)).       Medical Review Of Systems:  Constitutional: negative  Respiratory: negative  Cardiovascular: negative  Gastrointestinal: negative  Genitourinary:negative  Musculoskeletal:negative  Neurological: negative      Psychiatric Mental Status Exam:  General Appearance: disheveled  Arousal: alert  Behavior: guarded  Movements and Motor Activity: (+)Stereotypical movements noted  Orientation: oriented to person  Speech: brief responses  Mood: Paranoid  Affect: mood-congruent, constricted  Thought Process: slowed, delayed  Associations: Grossly intact  Thought Content and Perceptions: (+)paranoid delusions, (+)auditory hallucinations, (+)responding to internal stimuli, no suicidal ideation, no homicidal ideation  Recent and Remote Memory: recent memory intact, remote memory intact; per interview/observation with patient  Attention and Concentration: limited, attentive to conversation; per interview/observation with patient  Fund of Knowledge: limited; based on history, vocabulary, fund of knowledge, syntax, grammar, and content  Insight: questionable; based on understanding of severity of illness and HPI  Judgment: questionable; based on patient's behavior and HPI      ASSESSMENT/PLAN:   Problems Addressed/Diagnoses:  Schizophrenia, paranoid type (F20.0)  Stimulant (synthetic cannabinoid) use disorder (F15.20)  Cannabis use, 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     Plan:  Schizophrenia  -Continue Haldol to 10mg BID  -Continue Cogentin    Cannabis  -Group/Individual psychotherapy        Expected Disposition Plan: Home        MARGOT Orellana-BC

## 2023-07-07 NOTE — GROUP NOTE
Group Psychotherapy       Group Focus: Psychiatric Medication Education      Number of patients in attendance: 12    Group Start Time: 2030  Group End Time:  2100  Groups Date: 7/6/2023  Group Topic:  Behavioral Health  Group Department: [facility]  Group Facilitators:  Dianne Kaplan RN  _____________________________________________________________________    Patient Name: Libia Escalona  MRN: [id number]  Patient Class: IP- Psych   Admission Date\Time: 6/29/2023  2:44 PM  Hospital Length of Stay: 7  Primary Care Provider: Rolando Da Silva Jr, MD     Referred by: Acute Psychiatry Unit Treatment Team     Target symptoms: Psychosis     Patient's response to treatment: Not Participating     Progress toward goals: Progressing slowly     Interval History:      Diagnosis: Psychotic Disorder, NOS; Unknown Substance Abuse     Plan: Continue treatment on [facility]

## 2024-09-14 ENCOUNTER — HOSPITAL ENCOUNTER (EMERGENCY)
Facility: HOSPITAL | Age: 29
Discharge: HOME | End: 2024-09-14
Attending: EMERGENCY MEDICINE
Payer: MEDICAID

## 2024-09-14 VITALS
WEIGHT: 253.5 LBS | DIASTOLIC BLOOD PRESSURE: 74 MMHG | HEIGHT: 67 IN | BODY MASS INDEX: 39.79 KG/M2 | HEART RATE: 112 BPM | RESPIRATION RATE: 20 BRPM | OXYGEN SATURATION: 100 % | SYSTOLIC BLOOD PRESSURE: 113 MMHG | TEMPERATURE: 100 F

## 2024-09-14 DIAGNOSIS — F48.9 MENTAL HEALTH PROBLEM: Primary | ICD-10-CM

## 2024-09-14 PROCEDURE — 99284 EMERGENCY DEPT VISIT MOD MDM: CPT

## 2024-09-14 RX ORDER — HALOPERIDOL 10 MG/1
10 TABLET ORAL 2 TIMES DAILY
Qty: 60 TABLET | Refills: 0 | Status: SHIPPED | OUTPATIENT
Start: 2024-09-14 | End: 2024-10-14

## 2024-09-14 RX ORDER — BENZTROPINE MESYLATE 1 MG/1
1 TABLET ORAL 2 TIMES DAILY
Qty: 60 TABLET | Refills: 0 | Status: SHIPPED | OUTPATIENT
Start: 2024-09-14 | End: 2024-10-14

## 2024-09-14 NOTE — ED PROVIDER NOTES
ED PROVIDER NOTE  9/14/2024    CHIEF COMPLAINT:   Chief Complaint   Patient presents with    Psychiatric Evaluation     Family states he talks to himself, breaks windows, plays with his clothes, refuses to get off of business property. Denies any si/hi.        HISTORY OF PRESENT ILLNESS:   Libia Escalona is a 28 y.o. male who presents with chief complaint Mental health evaluation. Sister reports that he is talking to himself and not taking his medication. He states that he is taking his medication and denies having hallucinations or suicidal or homicidal ideation.    The history is provided by the patient and a relative.         REVIEW OF SYSTEMS: as noted in the HPI.  NURSING NOTES REVIEWED      PAST MEDICAL/SURGICAL HISTORY: History reviewed. No pertinent past medical history. History reviewed. No pertinent surgical history.    FAMILY HISTORY: No family history on file.    SOCIAL HISTORY:   Social History     Tobacco Use    Smoking status: Never    Smokeless tobacco: Never   Substance Use Topics    Alcohol use: Not Currently    Drug use: Never       ALLERGIES: Review of patient's allergies indicates:  No Known Allergies    PHYSICAL EXAM:  Initial Vitals [09/14/24 1100]   BP Pulse Resp Temp SpO2   113/74 (!) 112 20 99.7 °F (37.6 °C) 100 %      MAP       --         Physical Exam    Nursing note and vitals reviewed.  Constitutional: He appears well-developed and well-nourished. No distress.   HENT:   Head: Normocephalic and atraumatic.   Nose: Nose normal.   Mouth/Throat: Oropharynx is clear and moist and mucous membranes are normal.   Eyes: Conjunctivae and EOM are normal. Pupils are equal, round, and reactive to light.   Neck: Neck supple. No tracheal deviation present.   Cardiovascular:  Regular rhythm, normal heart sounds, intact distal pulses and normal pulses.   Tachycardia present.         Pulmonary/Chest: Effort normal and breath sounds normal. No respiratory distress.   Abdominal: Abdomen is soft. There is no  abdominal tenderness. There is no rebound and no guarding.   Musculoskeletal:         General: Normal range of motion.      Cervical back: Neck supple.     Neurological: He is alert and oriented to person, place, and time. GCS eye subscore is 4. GCS verbal subscore is 5. GCS motor subscore is 6.   CN II-XII intact. Moves all extremities. No gross sensory or motor deficits.   Skin: Skin is warm, dry and intact.   Psychiatric: His behavior is normal. Judgment and thought content normal. His affect is blunt. His speech is delayed. Cognition and memory are normal. He expresses no homicidal and no suicidal ideation.         RESULTS:  Labs Reviewed - No data to display  Imaging Results    None         PROCEDURES:  Procedures    ECG:       ED COURSE AND MEDICAL DECISION MAKING:  Medications - No data to display        Medical Decision Making  28-year-old male who presents with sister for mental health evaluation, sister states that he has been talking to himself and repeatedly lifts up the bottom of his shirt.  She states she was also concerned because he broke the glass at a convenience store and was told to leave but later went back to the store and the employees threatened to call the police on him.  He has not made any threats to harm anyone and he denies having any suicidal or homicidal ideation.  I do not see any evidence to indicate the need for involuntary commitment and strongly encouraged him to be compliant with the his medications and follow up with mental health outpatient.  He verbally contracts for safety.    Amount and/or Complexity of Data Reviewed  Independent Historian: caregiver     Details: sister  External Data Reviewed: notes.    Risk  Prescription drug management.        CLINICAL IMPRESSION:  1. Mental health problem        DISPOSITION:   ED Disposition Condition    Discharge Stable            ED Prescriptions       Medication Sig Dispense Start Date End Date Auth. Provider    haloperidoL (HALDOL)  10 MG tablet Take 1 tablet (10 mg total) by mouth 2 (two) times daily. 60 tablet 9/14/2024 10/14/2024 Abran Alejandro DO    benztropine (COGENTIN) 1 MG tablet Take 1 tablet (1 mg total) by mouth 2 (two) times daily. 60 tablet 9/14/2024 10/14/2024 Abran Alejandro DO          Follow-up Information       Follow up With Specialties Details Why Contact Info    Rolando Da Silva Jr., MD Emergency Medicine Schedule an appointment as soon as possible for a visit   [address] [place] [id number]  [phone]      [facility] Emergency Medicine  If symptoms worsen [address] [place] [address] [phone]               Abran Alejandro DO  09/14/24 1934